# Patient Record
Sex: FEMALE | Race: WHITE | NOT HISPANIC OR LATINO | Employment: OTHER | URBAN - METROPOLITAN AREA
[De-identification: names, ages, dates, MRNs, and addresses within clinical notes are randomized per-mention and may not be internally consistent; named-entity substitution may affect disease eponyms.]

---

## 2018-11-27 ENCOUNTER — HOSPITAL ENCOUNTER (EMERGENCY)
Facility: HOSPITAL | Age: 48
Discharge: HOME | End: 2018-11-28
Attending: EMERGENCY MEDICINE
Payer: MEDICARE

## 2018-11-27 DIAGNOSIS — R42 DIZZINESS: ICD-10-CM

## 2018-11-27 DIAGNOSIS — H81.399 PERIPHERAL VERTIGO, UNSPECIFIED LATERALITY: Primary | ICD-10-CM

## 2018-11-27 LAB
ALBUMIN SERPL BCP-MCNC: 3.8 G/DL
ALP SERPL-CCNC: 96 U/L
ALT SERPL W/O P-5'-P-CCNC: 74 U/L
ANION GAP SERPL CALC-SCNC: 8 MMOL/L
AST SERPL-CCNC: 50 U/L
B-HCG UR QL: NEGATIVE
BACTERIA #/AREA URNS HPF: ABNORMAL /HPF
BASOPHILS # BLD AUTO: 0.05 K/UL
BASOPHILS NFR BLD: 0.6 %
BILIRUB SERPL-MCNC: 0.3 MG/DL
BILIRUB UR QL STRIP: NEGATIVE
BUN SERPL-MCNC: 18 MG/DL
CALCIUM SERPL-MCNC: 9.4 MG/DL
CHLORIDE SERPL-SCNC: 105 MMOL/L
CLARITY UR: CLEAR
CO2 SERPL-SCNC: 24 MMOL/L
COLOR UR: YELLOW
CREAT SERPL-MCNC: 0.8 MG/DL
CTP QC/QA: YES
DIFFERENTIAL METHOD: ABNORMAL
EOSINOPHIL # BLD AUTO: 0.3 K/UL
EOSINOPHIL NFR BLD: 4 %
ERYTHROCYTE [DISTWIDTH] IN BLOOD BY AUTOMATED COUNT: 15.5 %
EST. GFR  (AFRICAN AMERICAN): >60 ML/MIN/1.73 M^2
EST. GFR  (NON AFRICAN AMERICAN): >60 ML/MIN/1.73 M^2
GLUCOSE SERPL-MCNC: 100 MG/DL (ref 70–110)
GLUCOSE SERPL-MCNC: 110 MG/DL
GLUCOSE UR QL STRIP: NEGATIVE
HCT VFR BLD AUTO: 40 %
HGB BLD-MCNC: 12.7 G/DL
HGB UR QL STRIP: ABNORMAL
KETONES UR QL STRIP: NEGATIVE
LEUKOCYTE ESTERASE UR QL STRIP: ABNORMAL
LIPASE SERPL-CCNC: 37 U/L
LYMPHOCYTES # BLD AUTO: 2.5 K/UL
LYMPHOCYTES NFR BLD: 32.3 %
MCH RBC QN AUTO: 27.2 PG
MCHC RBC AUTO-ENTMCNC: 31.8 G/DL
MCV RBC AUTO: 86 FL
MICROSCOPIC COMMENT: ABNORMAL
MONOCYTES # BLD AUTO: 0.6 K/UL
MONOCYTES NFR BLD: 7.4 %
NEUTROPHILS # BLD AUTO: 4.4 K/UL
NEUTROPHILS NFR BLD: 55.7 %
NITRITE UR QL STRIP: NEGATIVE
PH UR STRIP: 5 [PH] (ref 5–8)
PLATELET # BLD AUTO: 247 K/UL
PMV BLD AUTO: 12.6 FL
POCT GLUCOSE: 100 MG/DL (ref 70–110)
POTASSIUM SERPL-SCNC: 4.3 MMOL/L
PROT SERPL-MCNC: 8.2 G/DL
PROT UR QL STRIP: NEGATIVE
RBC # BLD AUTO: 4.67 M/UL
RBC #/AREA URNS HPF: 1 /HPF (ref 0–4)
SODIUM SERPL-SCNC: 137 MMOL/L
SP GR UR STRIP: 1.01 (ref 1–1.03)
SQUAMOUS #/AREA URNS HPF: 15 /HPF
TRICHOMONAS UR QL MICRO: ABNORMAL
TROPONIN I SERPL DL<=0.01 NG/ML-MCNC: <0.006 NG/ML
URN SPEC COLLECT METH UR: ABNORMAL
UROBILINOGEN UR STRIP-ACNC: ABNORMAL EU/DL
WBC # BLD AUTO: 7.81 K/UL
WBC #/AREA URNS HPF: 8 /HPF (ref 0–5)

## 2018-11-27 PROCEDURE — 36000 PLACE NEEDLE IN VEIN: CPT

## 2018-11-27 PROCEDURE — 82962 GLUCOSE BLOOD TEST: CPT | Mod: 91

## 2018-11-27 PROCEDURE — 83690 ASSAY OF LIPASE: CPT

## 2018-11-27 PROCEDURE — 93010 ELECTROCARDIOGRAM REPORT: CPT | Mod: ,,, | Performed by: INTERNAL MEDICINE

## 2018-11-27 PROCEDURE — 93005 ELECTROCARDIOGRAM TRACING: CPT

## 2018-11-27 PROCEDURE — 81025 URINE PREGNANCY TEST: CPT | Performed by: PHYSICIAN ASSISTANT

## 2018-11-27 PROCEDURE — 84484 ASSAY OF TROPONIN QUANT: CPT

## 2018-11-27 PROCEDURE — 81000 URINALYSIS NONAUTO W/SCOPE: CPT

## 2018-11-27 PROCEDURE — 99285 EMERGENCY DEPT VISIT HI MDM: CPT | Mod: 25

## 2018-11-27 PROCEDURE — 80053 COMPREHEN METABOLIC PANEL: CPT

## 2018-11-27 PROCEDURE — 85025 COMPLETE CBC W/AUTO DIFF WBC: CPT

## 2018-11-27 RX ORDER — FERROUS SULFATE 325(65) MG
325 TABLET, DELAYED RELEASE (ENTERIC COATED) ORAL
COMMUNITY
End: 2019-11-25

## 2018-11-27 RX ORDER — LEVOTHYROXINE SODIUM 125 UG/1
125 TABLET ORAL DAILY
COMMUNITY
End: 2019-11-25

## 2018-11-27 RX ORDER — LISINOPRIL 20 MG/1
20 TABLET ORAL DAILY
Status: ON HOLD | COMMUNITY
End: 2018-12-27

## 2018-11-28 VITALS
OXYGEN SATURATION: 97 % | BODY MASS INDEX: 47.09 KG/M2 | HEART RATE: 58 BPM | RESPIRATION RATE: 18 BRPM | TEMPERATURE: 98 F | HEIGHT: 66 IN | WEIGHT: 293 LBS | DIASTOLIC BLOOD PRESSURE: 71 MMHG | SYSTOLIC BLOOD PRESSURE: 101 MMHG

## 2018-11-28 RX ORDER — MECLIZINE HCL 12.5 MG 12.5 MG/1
12.5 TABLET ORAL 3 TIMES DAILY PRN
Qty: 20 TABLET | Refills: 0 | Status: SHIPPED | OUTPATIENT
Start: 2018-11-28

## 2018-11-28 NOTE — ED TRIAGE NOTES
Patient arrived to Ed with c/o of dizziness, nausea ,double vision  And unsteady gait x3 day. Denies any fever, chest pain, and change of LOC.

## 2018-11-28 NOTE — ED PROVIDER NOTES
"Encounter Date: 11/27/2018  48 y.o. female complains dizziness, headache, nausea x3 days.  Patient will be seen by another provider for further evaluation when an exam room is available. Mir BARAKAT, 8:38 PM    SCRIBE #1 NOTE: I, Carrillo Reynoso, am scribing for, and in the presence of,  Hipolito Love MD. I have scribed the following portions of the note - Other sections scribed: HPI, ROS, PE.       History     Chief Complaint   Patient presents with    Dizziness     "I think it's my blood pressure." Pt reports dizziness x 3 days. Pt reports feeling "drunk for 3 days, trying to fall over but I had to catch myself. And my eyes feel like they go cross." Denies ETOH.     CC: Dizziness    HPI: This 48 y.o. Female with depression and HTN presents to the ED for an emergent evaluation of acute onset mild dizziness and light-headedness for the past x3 days. Pt reports her symptoms began in the morning and on the first day she had nausea and vomiting which was relieved that day. "I feel drunk but did not drink alcohol." Her dizziness feels like a "room spinning" sensation which has been occurring all day today. Pt admits last time she felt like this was when she was dx with HTN. She got back on her meds 6/2018 and takes them daily. Pt denies fever, abdominal pain, dysuria, numbness or back pain.    PSHx: C section, upper/lower GI colonoscopy 11/16/18, polyp biopsy      The history is provided by the patient. No  was used.     Review of patient's allergies indicates:  Allergies not on file  Past Medical History:   Diagnosis Date    Depression     Hypertension      History reviewed. No pertinent surgical history.  History reviewed. No pertinent family history.  Social History     Tobacco Use    Smoking status: Never Smoker    Smokeless tobacco: Never Used   Substance Use Topics    Alcohol use: Yes     Comment: socially    Drug use: No     Review of Systems   Constitutional: Negative for chills " and fever.   HENT: Negative for ear pain, rhinorrhea and sore throat.    Eyes: Negative for redness.   Respiratory: Negative for shortness of breath.    Cardiovascular: Negative for chest pain.   Gastrointestinal: Negative for abdominal pain, diarrhea, nausea and vomiting.   Genitourinary: Negative for dysuria and hematuria.   Musculoskeletal: Negative for back pain and neck pain.   Skin: Negative for rash.   Neurological: Positive for dizziness and light-headedness. Negative for weakness, numbness and headaches.   Hematological: Does not bruise/bleed easily.   Psychiatric/Behavioral: The patient is not nervous/anxious.        Physical Exam     Initial Vitals [11/27/18 2036]   BP Pulse Resp Temp SpO2   (!) 143/70 60 18 98.5 °F (36.9 °C) 97 %      MAP       --         Physical Exam    Nursing note and vitals reviewed.  Constitutional: She appears well-developed and well-nourished. She is not diaphoretic. She is Obese . No distress.   Pt is morbidly obese.   HENT:   Head: Normocephalic and atraumatic.   Nose: Nose normal.   Eyes: EOM are normal. Pupils are equal, round, and reactive to light. No scleral icterus.   Neck: Normal range of motion. Neck supple.   Cardiovascular: Normal rate, regular rhythm, normal heart sounds and intact distal pulses. Exam reveals no gallop and no friction rub.    No murmur heard.  Pulmonary/Chest: Breath sounds normal. No stridor. No respiratory distress. She has no wheezes. She has no rhonchi. She has no rales.   Abdominal: Soft. Normal appearance and bowel sounds are normal. She exhibits no distension. There is no tenderness. There is no rebound and no guarding.   Musculoskeletal: Normal range of motion. She exhibits no edema or tenderness.   Neurological: She is oriented to person, place, and time. No cranial nerve deficit.   Skin: Skin is warm and dry. No rash noted.   Psychiatric: She has a normal mood and affect. Her behavior is normal.         ED Course   Procedures  Labs Reviewed    URINALYSIS, REFLEX TO URINE CULTURE - Abnormal; Notable for the following components:       Result Value    Occult Blood UA 1+ (*)     Urobilinogen, UA 2.0-3.0 (*)     Leukocytes, UA 2+ (*)     All other components within normal limits    Narrative:     Preferred Collection Type->Urine, Clean Catch   CBC W/ AUTO DIFFERENTIAL - Abnormal; Notable for the following components:    MCHC 31.8 (*)     RDW 15.5 (*)     All other components within normal limits   COMPREHENSIVE METABOLIC PANEL - Abnormal; Notable for the following components:    AST 50 (*)     ALT 74 (*)     All other components within normal limits   URINALYSIS MICROSCOPIC - Abnormal; Notable for the following components:    WBC, UA 8 (*)     Bacteria, UA Few (*)     Trichomonas, UA Occasional (*)     All other components within normal limits    Narrative:     Preferred Collection Type->Urine, Clean Catch   LIPASE   TROPONIN I   POCT URINE PREGNANCY   POCT GLUCOSE   POCT GLUCOSE MONITORING CONTINUOUS          Imaging Results          CT Head Without Contrast (Final result)  Result time 11/27/18 23:32:50    Final result by Noah Foley MD (11/27/18 23:32:50)                 Impression:      No CT evidence of acute intracranial abnormality. Clinical correlation and further evaluation as warranted.    Mild paranasal sinus disease as above.      Electronically signed by: Noah Foley MD  Date:    11/27/2018  Time:    23:32             Narrative:    EXAMINATION:  CT HEAD WITHOUT CONTRAST    CLINICAL HISTORY:  persistent dizziness;    TECHNIQUE:  Low dose axial images were obtained through the head.  Coronal and sagittal reformations were also performed. Contrast was not administered.    COMPARISON:  None.    FINDINGS:  There is no acute intracranial hemorrhage, hydrocephalus, midline shift or mass effect. Gray-white matter differentiation appears maintained. No extra-axial fluid collections identified.  The basal cisterns are patent. There is mild  mucosal thickening of the left sphenoid sinus.  There is minimal opacification of the right mastoid air cells.  The visualized bones of the calvarium demonstrate no acute osseous abnormality.                                 Medical Decision Making:   Initial Assessment:   48-year-old female history of morbid obesity, hypertension, hyperlipidemia presenting with persistent dizziness intermittent for 3 days.  Patient states they dizziness was worse, lasting 10 min at a time without relief with holding still.  Patient states could be brought on by walking.  Notes it is usually worse 1st thing in the morning.  Denies any exacerbating or relieving factors.  Reports that mostly feels like a vertiginous feeling.  Denies chest pain, nausea, vomiting, shortness of breath, diaphoresis.  On exam, patient well-appearing in no acute distress.  Neuro exam normal.  No focal neuro deficits.  Equal bilateral  strength, no pronator drift, no ataxia with finger-to-nose or heel-to-shin, no nystagmus.  Cranial nerves 2-12 intact. Dizziness was unable to be provoked with Dallas-Hallpike test.  The patient denies any minimal ongoing dizziness at the time of exam.  We will get a head CT, labs, EKG, and reassess.  EKG shows sinus bradycardia, rate of 57, no significant ST segment elevation or depressions concerning for acute ischemia.  ED Management:  CT scan and labs unremarkable. Symptoms have been ongoing for greater than 3 days.  They are intermittent and associated with position change.  They are not associated with any other neurologic deficits.  She denies changes in vision.  Denies chest pain, shortness of breath, nausea, vomiting. Neuro exam is completely normal. No nystagmus or ataxia.  I significantly doubt central cause.  Test of skew negative and head impulse not consistent with a central cause.  Vertigo is not persistent.  I note some effusion behind her ear.  It does not look significantly infected.  I discussed the need to  follow up with her primary care provider as well as her neurologist.  She is mildly orthostatic positive. Given this, I believe she is safe for discharge home.  I have discussed strict return precautions.  She is amenable to being discharged.  I encourage plenty of p.o. fluid intake as well as have prescribed her a prescription for meclizine.            Scribe Attestation:   Scribe #1: I performed the above scribed service and the documentation accurately describes the services I performed. I attest to the accuracy of the note.    Attending Attestation:           Physician Attestation for Scribe:  Physician Attestation Statement for Scribe #1: I, Hipolito Love MD, reviewed documentation, as scribed by Carrillo Reynoso in my presence, and it is both accurate and complete.                    Clinical Impression:   The primary encounter diagnosis was Peripheral vertigo, unspecified laterality. A diagnosis of Dizziness was also pertinent to this visit.      Disposition:   Disposition: Discharged  Condition: Stable                        Hipolito Love MD  11/28/18 0056

## 2018-11-28 NOTE — DISCHARGE INSTRUCTIONS
You were seen in the emergency department for dizziness.  Your labs, exam, and CT scan are all reassuring.  We are not sure what the cause of your dizziness is but we do not think it is anything dangerous at this time.  Please follow-up with your primary care provider this week.  Please return for any new or worsening episodes, nausea, vomiting, difficulty breathing, numbness, weakness, changes in vision, you become concerned in any other way.

## 2018-12-24 ENCOUNTER — HOSPITAL ENCOUNTER (INPATIENT)
Facility: HOSPITAL | Age: 48
LOS: 3 days | Discharge: HOME | DRG: 872 | End: 2018-12-27
Attending: EMERGENCY MEDICINE | Admitting: INTERNAL MEDICINE
Payer: MEDICARE

## 2018-12-24 DIAGNOSIS — L03.90 CELLULITIS: ICD-10-CM

## 2018-12-24 DIAGNOSIS — L03.90 SEPSIS DUE TO CELLULITIS: ICD-10-CM

## 2018-12-24 DIAGNOSIS — R10.11 RUQ PAIN: ICD-10-CM

## 2018-12-24 DIAGNOSIS — R00.0 TACHYCARDIA: ICD-10-CM

## 2018-12-24 DIAGNOSIS — A41.9 SEPSIS DUE TO CELLULITIS: ICD-10-CM

## 2018-12-24 DIAGNOSIS — A41.9 SEPSIS, DUE TO UNSPECIFIED ORGANISM: Primary | ICD-10-CM

## 2018-12-24 DIAGNOSIS — A59.9 TRICHOMONAS INFECTION: ICD-10-CM

## 2018-12-24 LAB
ALBUMIN SERPL BCP-MCNC: 3.8 G/DL
ALP SERPL-CCNC: 91 U/L
ALT SERPL W/O P-5'-P-CCNC: 85 U/L
ANION GAP SERPL CALC-SCNC: 10 MMOL/L
AST SERPL-CCNC: 62 U/L
B-HCG UR QL: NEGATIVE
BACTERIA #/AREA URNS HPF: ABNORMAL /HPF
BASOPHILS # BLD AUTO: 0.01 K/UL
BASOPHILS NFR BLD: 0.1 %
BILIRUB SERPL-MCNC: 0.9 MG/DL
BILIRUB UR QL STRIP: NEGATIVE
BUN SERPL-MCNC: 25 MG/DL
CALCIUM SERPL-MCNC: 9.2 MG/DL
CHLORIDE SERPL-SCNC: 105 MMOL/L
CLARITY UR: ABNORMAL
CO2 SERPL-SCNC: 20 MMOL/L
COLOR UR: ABNORMAL
CREAT SERPL-MCNC: 1.2 MG/DL
CTP QC/QA: YES
DIFFERENTIAL METHOD: ABNORMAL
EOSINOPHIL # BLD AUTO: 0 K/UL
EOSINOPHIL NFR BLD: 0.1 %
ERYTHROCYTE [DISTWIDTH] IN BLOOD BY AUTOMATED COUNT: 15.6 %
EST. GFR  (AFRICAN AMERICAN): >60 ML/MIN/1.73 M^2
EST. GFR  (NON AFRICAN AMERICAN): 54 ML/MIN/1.73 M^2
GLUCOSE SERPL-MCNC: 94 MG/DL
GLUCOSE UR QL STRIP: NEGATIVE
HCT VFR BLD AUTO: 41.5 %
HGB BLD-MCNC: 13.5 G/DL
HGB UR QL STRIP: NEGATIVE
HYALINE CASTS #/AREA URNS LPF: 1 /LPF
KETONES UR QL STRIP: NEGATIVE
LACTATE SERPL-SCNC: 1.7 MMOL/L
LEUKOCYTE ESTERASE UR QL STRIP: ABNORMAL
LYMPHOCYTES # BLD AUTO: 0.3 K/UL
LYMPHOCYTES NFR BLD: 3.3 %
MCH RBC QN AUTO: 27.8 PG
MCHC RBC AUTO-ENTMCNC: 32.5 G/DL
MCV RBC AUTO: 86 FL
MICROSCOPIC COMMENT: ABNORMAL
MONOCYTES # BLD AUTO: 0.8 K/UL
MONOCYTES NFR BLD: 7.5 %
NEUTROPHILS # BLD AUTO: 9.2 K/UL
NEUTROPHILS NFR BLD: 89 %
NITRITE UR QL STRIP: NEGATIVE
PH UR STRIP: 5 [PH] (ref 5–8)
PLATELET # BLD AUTO: 175 K/UL
PMV BLD AUTO: 12.4 FL
POTASSIUM SERPL-SCNC: 3.3 MMOL/L
PROT SERPL-MCNC: 7.8 G/DL
PROT UR QL STRIP: ABNORMAL
RBC # BLD AUTO: 4.85 M/UL
RBC #/AREA URNS HPF: 3 /HPF (ref 0–4)
SODIUM SERPL-SCNC: 135 MMOL/L
SP GR UR STRIP: 1.02 (ref 1–1.03)
SQUAMOUS #/AREA URNS HPF: 20 /HPF
TRICHOMONAS UR QL MICRO: ABNORMAL
TSH SERPL DL<=0.005 MIU/L-ACNC: 2.62 UIU/ML
URN SPEC COLLECT METH UR: ABNORMAL
UROBILINOGEN UR STRIP-ACNC: ABNORMAL EU/DL
WBC # BLD AUTO: 10.32 K/UL
WBC #/AREA URNS HPF: 20 /HPF (ref 0–5)

## 2018-12-24 PROCEDURE — 96374 THER/PROPH/DIAG INJ IV PUSH: CPT | Mod: 59

## 2018-12-24 PROCEDURE — 93005 ELECTROCARDIOGRAM TRACING: CPT

## 2018-12-24 PROCEDURE — 87040 BLOOD CULTURE FOR BACTERIA: CPT

## 2018-12-24 PROCEDURE — 87491 CHLMYD TRACH DNA AMP PROBE: CPT

## 2018-12-24 PROCEDURE — 81025 URINE PREGNANCY TEST: CPT | Performed by: EMERGENCY MEDICINE

## 2018-12-24 PROCEDURE — 12000002 HC ACUTE/MED SURGE SEMI-PRIVATE ROOM

## 2018-12-24 PROCEDURE — 25000003 PHARM REV CODE 250: Performed by: EMERGENCY MEDICINE

## 2018-12-24 PROCEDURE — 80053 COMPREHEN METABOLIC PANEL: CPT

## 2018-12-24 PROCEDURE — 81000 URINALYSIS NONAUTO W/SCOPE: CPT

## 2018-12-24 PROCEDURE — 87210 SMEAR WET MOUNT SALINE/INK: CPT

## 2018-12-24 PROCEDURE — 96365 THER/PROPH/DIAG IV INF INIT: CPT

## 2018-12-24 PROCEDURE — 87086 URINE CULTURE/COLONY COUNT: CPT

## 2018-12-24 PROCEDURE — 96361 HYDRATE IV INFUSION ADD-ON: CPT | Mod: 59

## 2018-12-24 PROCEDURE — 84443 ASSAY THYROID STIM HORMONE: CPT

## 2018-12-24 PROCEDURE — 83605 ASSAY OF LACTIC ACID: CPT

## 2018-12-24 PROCEDURE — 99285 EMERGENCY DEPT VISIT HI MDM: CPT | Mod: 25

## 2018-12-24 PROCEDURE — 93010 ELECTROCARDIOGRAM REPORT: CPT | Mod: ,,, | Performed by: INTERNAL MEDICINE

## 2018-12-24 PROCEDURE — 85025 COMPLETE CBC W/AUTO DIFF WBC: CPT

## 2018-12-24 PROCEDURE — 87591 N.GONORRHOEAE DNA AMP PROB: CPT

## 2018-12-24 PROCEDURE — 63600175 PHARM REV CODE 636 W HCPCS: Performed by: EMERGENCY MEDICINE

## 2018-12-24 PROCEDURE — 96375 TX/PRO/DX INJ NEW DRUG ADDON: CPT

## 2018-12-24 RX ORDER — METRONIDAZOLE 500 MG/1
2000 TABLET ORAL
Status: COMPLETED | OUTPATIENT
Start: 2018-12-24 | End: 2018-12-24

## 2018-12-24 RX ORDER — ONDANSETRON 2 MG/ML
4 INJECTION INTRAMUSCULAR; INTRAVENOUS
Status: COMPLETED | OUTPATIENT
Start: 2018-12-24 | End: 2018-12-24

## 2018-12-24 RX ADMIN — SODIUM CHLORIDE 2000 ML: 0.9 INJECTION, SOLUTION INTRAVENOUS at 09:12

## 2018-12-24 RX ADMIN — METRONIDAZOLE 2000 MG: 500 TABLET ORAL at 11:12

## 2018-12-24 RX ADMIN — VANCOMYCIN HYDROCHLORIDE 2000 MG: 1 INJECTION, POWDER, LYOPHILIZED, FOR SOLUTION INTRAVENOUS at 10:12

## 2018-12-24 RX ADMIN — ONDANSETRON 4 MG: 2 INJECTION INTRAMUSCULAR; INTRAVENOUS at 10:12

## 2018-12-24 RX ADMIN — PIPERACILLIN AND TAZOBACTAM 4.5 G: 4; .5 INJECTION, POWDER, LYOPHILIZED, FOR SOLUTION INTRAVENOUS; PARENTERAL at 09:12

## 2018-12-25 PROBLEM — I10 ESSENTIAL HYPERTENSION: Status: ACTIVE | Noted: 2018-12-25

## 2018-12-25 PROBLEM — E66.01 MORBID OBESITY: Status: ACTIVE | Noted: 2018-12-25

## 2018-12-25 PROBLEM — L03.90 SEPSIS DUE TO CELLULITIS: Status: ACTIVE | Noted: 2018-12-24

## 2018-12-25 LAB
ANION GAP SERPL CALC-SCNC: 9 MMOL/L
BACTERIA GENITAL QL WET PREP: ABNORMAL
BASOPHILS # BLD AUTO: 0.02 K/UL
BASOPHILS NFR BLD: 0.1 %
BUN SERPL-MCNC: 30 MG/DL
C TRACH DNA SPEC QL NAA+PROBE: NOT DETECTED
CALCIUM SERPL-MCNC: 8.2 MG/DL
CHLORIDE SERPL-SCNC: 106 MMOL/L
CLUE CELLS VAG QL WET PREP: ABNORMAL
CO2 SERPL-SCNC: 20 MMOL/L
CREAT SERPL-MCNC: 1.1 MG/DL
DIFFERENTIAL METHOD: ABNORMAL
EOSINOPHIL # BLD AUTO: 0 K/UL
EOSINOPHIL NFR BLD: 0 %
ERYTHROCYTE [DISTWIDTH] IN BLOOD BY AUTOMATED COUNT: 16.1 %
EST. GFR  (AFRICAN AMERICAN): >60 ML/MIN/1.73 M^2
EST. GFR  (NON AFRICAN AMERICAN): >60 ML/MIN/1.73 M^2
FILAMENT FUNGI VAG WET PREP-#/AREA: ABNORMAL
GLUCOSE SERPL-MCNC: 87 MG/DL
HCT VFR BLD AUTO: 38.5 %
HGB BLD-MCNC: 12 G/DL
LYMPHOCYTES # BLD AUTO: 0.4 K/UL
LYMPHOCYTES NFR BLD: 2.7 %
MCH RBC QN AUTO: 27.1 PG
MCHC RBC AUTO-ENTMCNC: 31.2 G/DL
MCV RBC AUTO: 87 FL
MONOCYTES # BLD AUTO: 0.6 K/UL
MONOCYTES NFR BLD: 4 %
N GONORRHOEA DNA SPEC QL NAA+PROBE: NOT DETECTED
NEUTROPHILS # BLD AUTO: 13.8 K/UL
NEUTROPHILS NFR BLD: 92.9 %
PLATELET # BLD AUTO: 165 K/UL
PMV BLD AUTO: 12.4 FL
POTASSIUM SERPL-SCNC: 3.8 MMOL/L
RBC # BLD AUTO: 4.43 M/UL
SODIUM SERPL-SCNC: 135 MMOL/L
SPECIMEN SOURCE: ABNORMAL
T VAGINALIS GENITAL QL WET PREP: ABNORMAL
WBC # BLD AUTO: 14.86 K/UL
WBC #/AREA VAG WET PREP: ABNORMAL
YEAST GENITAL QL WET PREP: ABNORMAL

## 2018-12-25 PROCEDURE — 25000003 PHARM REV CODE 250: Performed by: EMERGENCY MEDICINE

## 2018-12-25 PROCEDURE — 63600175 PHARM REV CODE 636 W HCPCS: Performed by: EMERGENCY MEDICINE

## 2018-12-25 PROCEDURE — 36415 COLL VENOUS BLD VENIPUNCTURE: CPT

## 2018-12-25 PROCEDURE — 80048 BASIC METABOLIC PNL TOTAL CA: CPT

## 2018-12-25 PROCEDURE — 25000003 PHARM REV CODE 250: Performed by: INTERNAL MEDICINE

## 2018-12-25 PROCEDURE — 94761 N-INVAS EAR/PLS OXIMETRY MLT: CPT

## 2018-12-25 PROCEDURE — 85025 COMPLETE CBC W/AUTO DIFF WBC: CPT

## 2018-12-25 PROCEDURE — 63600175 PHARM REV CODE 636 W HCPCS: Performed by: INTERNAL MEDICINE

## 2018-12-25 PROCEDURE — 11000001 HC ACUTE MED/SURG PRIVATE ROOM

## 2018-12-25 RX ORDER — SODIUM CHLORIDE 9 MG/ML
INJECTION, SOLUTION INTRAVENOUS CONTINUOUS
Status: DISCONTINUED | OUTPATIENT
Start: 2018-12-25 | End: 2018-12-27 | Stop reason: HOSPADM

## 2018-12-25 RX ORDER — LEVOTHYROXINE SODIUM 125 UG/1
125 TABLET ORAL DAILY
Status: DISCONTINUED | OUTPATIENT
Start: 2018-12-25 | End: 2018-12-27 | Stop reason: HOSPADM

## 2018-12-25 RX ORDER — ONDANSETRON 8 MG/1
8 TABLET, ORALLY DISINTEGRATING ORAL EVERY 8 HOURS PRN
Status: DISCONTINUED | OUTPATIENT
Start: 2018-12-25 | End: 2018-12-27 | Stop reason: HOSPADM

## 2018-12-25 RX ORDER — ENOXAPARIN SODIUM 100 MG/ML
40 INJECTION SUBCUTANEOUS EVERY 12 HOURS
Status: DISCONTINUED | OUTPATIENT
Start: 2018-12-25 | End: 2018-12-27 | Stop reason: HOSPADM

## 2018-12-25 RX ORDER — ACETAMINOPHEN 325 MG/1
650 TABLET ORAL EVERY 8 HOURS PRN
Status: DISCONTINUED | OUTPATIENT
Start: 2018-12-25 | End: 2018-12-27 | Stop reason: HOSPADM

## 2018-12-25 RX ADMIN — LEVOTHYROXINE SODIUM 125 MCG: 125 TABLET ORAL at 05:12

## 2018-12-25 RX ADMIN — PIPERACILLIN AND TAZOBACTAM 4.5 G: 4; .5 INJECTION, POWDER, LYOPHILIZED, FOR SOLUTION INTRAVENOUS; PARENTERAL at 09:12

## 2018-12-25 RX ADMIN — VANCOMYCIN HYDROCHLORIDE 1750 MG: 1 INJECTION, POWDER, LYOPHILIZED, FOR SOLUTION INTRAVENOUS at 10:12

## 2018-12-25 RX ADMIN — PIPERACILLIN AND TAZOBACTAM 4.5 G: 4; .5 INJECTION, POWDER, LYOPHILIZED, FOR SOLUTION INTRAVENOUS; PARENTERAL at 05:12

## 2018-12-25 RX ADMIN — SODIUM CHLORIDE: 0.9 INJECTION, SOLUTION INTRAVENOUS at 08:12

## 2018-12-25 RX ADMIN — ENOXAPARIN SODIUM 40 MG: 100 INJECTION SUBCUTANEOUS at 08:12

## 2018-12-25 RX ADMIN — ACETAMINOPHEN 650 MG: 325 TABLET ORAL at 08:12

## 2018-12-25 RX ADMIN — PIPERACILLIN AND TAZOBACTAM 4.5 G: 4; .5 INJECTION, POWDER, LYOPHILIZED, FOR SOLUTION INTRAVENOUS; PARENTERAL at 01:12

## 2018-12-25 RX ADMIN — VANCOMYCIN HYDROCHLORIDE 1750 MG: 1 INJECTION, POWDER, LYOPHILIZED, FOR SOLUTION INTRAVENOUS at 11:12

## 2018-12-25 RX ADMIN — ENOXAPARIN SODIUM 40 MG: 100 INJECTION SUBCUTANEOUS at 09:12

## 2018-12-25 RX ADMIN — SODIUM CHLORIDE 500 ML: 0.9 INJECTION, SOLUTION INTRAVENOUS at 08:12

## 2018-12-25 RX ADMIN — ACETAMINOPHEN 650 MG: 325 TABLET ORAL at 07:12

## 2018-12-25 NOTE — ED PROVIDER NOTES
"Encounter Date: 12/24/2018    This is a SORT/MSE of a 48 y.o. female presenting to the ED with c/o redness to the right lower leg. Hx of cellulitis. Reported fever at home. Patient diaphoretic in triage. . Care will be transferred to an alternate provider when patient is roomed for a full evaluation and final disposition. CHITRA Araujo, RUYP-C 12/24/2018 8:48 PM    SCRIBE #1 NOTE: I, Emily Yee, am scribing for, and in the presence of,  Sandy Oconnor MD. I have scribed the following portions of the note - Other sections scribed: HPI and ROS.       History     Chief Complaint   Patient presents with    Fever     reports she's had cellulitis of right leg x7 years and reports redness to area; reports taking naproxen at 1630; pt is diaphoretic at triage; denies trauma to area     CC: Leg Pain    HPI: This 48 y.o. Female, with a medical history of depression and hypertension, presents to the ED c/o acute, constant, moderate (5/10) pain and redness to the right upper leg that began today. Pt reports experiencing a "stretching" sensation to the area when moving as she adds that it also feels as though someone is "scratching" the leg. She reports experiencing a fever (presently resolved), chills, shakes but she notes that the symptoms have since improved. Pt states that she also experienced 1x episode of emesis about 1x hour ago, noting that she continues to feel nauseated and has a decreased appetite. She adds that she has been experiencing urinary frequency as well as lower abdominal pain and abdominal bloating (when bending down to urinate). She notes taking Naproxen for treatment at 4:30 pm today. Pt reports that she was recently diagnosed with an enlarged liver, enlarged spleen and gallstones ("It's covering 50% of my gallbladder"). She adds that she has also been experiencing acid reflux issues lately, noting that she has been taking medication prescribed by her gastroenterologist with some improvement " (notes she is continuing to burp). Pt denies dysuria, cough and sneezing. No other associated symptoms. No alleviating factors.       The history is provided by the patient. No  was used.     Review of patient's allergies indicates:  No Known Allergies  Past Medical History:   Diagnosis Date    Anemia     Bronchitis     Depression     GERD (gastroesophageal reflux disease)     Hypertension      Past Surgical History:   Procedure Laterality Date     SECTION      COLONOSCOPY      UPPER GASTROINTESTINAL ENDOSCOPY       History reviewed. No pertinent family history.  Social History     Tobacco Use    Smoking status: Never Smoker    Smokeless tobacco: Never Used   Substance Use Topics    Alcohol use: No     Frequency: Never    Drug use: No     Review of Systems   Constitutional: Positive for appetite change (decreased), chills and fever.        (+) shakes   HENT: Negative for congestion, ear pain, rhinorrhea, sneezing and sore throat.    Eyes: Negative for pain and visual disturbance.   Respiratory: Negative for cough and shortness of breath.    Cardiovascular: Negative for chest pain.   Gastrointestinal: Positive for abdominal pain (lower), nausea and vomiting. Negative for diarrhea.        (+) abdominal bloating   Genitourinary: Positive for frequency. Negative for dysuria.   Musculoskeletal: Negative for neck pain.        (+) right upper leg pain   Skin: Positive for color change (redness to the right upper leg). Negative for rash.   Neurological: Negative for headaches.   Psychiatric/Behavioral:        (+) difficulty concentrating       Physical Exam     Initial Vitals [18]   BP Pulse Resp Temp SpO2   119/69 (!) 131 (!) 25 99.6 °F (37.6 °C) 97 %      MAP       --         Physical Exam    Constitutional: She appears well-developed and well-nourished. She is diaphoretic. No distress.   Morbidly obese   HENT:   Mouth/Throat: Oropharynx is clear and moist.   Eyes: Pupils  are equal, round, and reactive to light. No scleral icterus.   Neck: Neck supple.   Cardiovascular: Regular rhythm and intact distal pulses. Tachycardia present.    Pulmonary/Chest: Breath sounds normal. No respiratory distress. She has no wheezes. She has no rhonchi. She has no rales.   Abdominal: Soft. Bowel sounds are normal. There is tenderness (TTP in RUQ, no rigidity or guarding).   Genitourinary:   Genitourinary Comments: Pelvic exam performed with Elena ABBOTT present as chaperone.  I do not appreciate any tenderness or swelling about the groin, labia bilaterally.  I am unable to visualize the patient's cervix on exam, she has vaginal tissue that obstructs the view, I do not appreciate any discharge in the vaginal vault.  She has no cervical motion tenderness on bimanual exam.   Neurological: She is alert and oriented to person, place, and time.   Skin: Skin is warm.   Large area of erythema on right upper middle thigh.  There is no crepitus.  Area is mildly tender to palpation.  I do not appreciate any hemorrhagic bullae.  I do not appreciate any areas of fluctuance, however exam is somewhat limited due to patient's body habitus.   Psychiatric: She has a normal mood and affect.         ED Course   Critical Care  Date/Time: 12/24/2018 11:49 PM  Performed by: Sandy Oconnor MD  Authorized by: Sandy Oconnor MD   Direct patient critical care time: 30 minutes  Ordering / reviewing critical care time: 10 minutes  Documentation critical care time: 10 minutes  Total critical care time (exclusive of procedural time) : 50 minutes  Critical care was necessary to treat or prevent imminent or life-threatening deterioration of the following conditions: circulatory failure and sepsis.  Critical care was time spent personally by me on the following activities: development of treatment plan with patient or surrogate, examination of patient, evaluation of patient's response to treatment, obtaining history from patient  or surrogate, ordering and performing treatments and interventions, ordering and review of radiographic studies, ordering and review of laboratory studies and re-evaluation of patient's condition.        Labs Reviewed   CBC W/ AUTO DIFFERENTIAL - Abnormal; Notable for the following components:       Result Value    RDW 15.6 (*)     Gran # (ANC) 9.2 (*)     Lymph # 0.3 (*)     Gran% 89.0 (*)     Lymph% 3.3 (*)     All other components within normal limits   COMPREHENSIVE METABOLIC PANEL - Abnormal; Notable for the following components:    Sodium 135 (*)     Potassium 3.3 (*)     CO2 20 (*)     BUN, Bld 25 (*)     AST 62 (*)     ALT 85 (*)     eGFR if non  54 (*)     All other components within normal limits   URINALYSIS, REFLEX TO URINE CULTURE - Abnormal; Notable for the following components:    Appearance, UA Cloudy (*)     Protein, UA 1+ (*)     Urobilinogen, UA 2.0-3.0 (*)     Leukocytes, UA 3+ (*)     All other components within normal limits    Narrative:     Preferred Collection Type->Urine, Clean Catch   URINALYSIS MICROSCOPIC - Abnormal; Notable for the following components:    WBC, UA 20 (*)     Bacteria, UA Many (*)     Trichomonas, UA Occasional (*)     All other components within normal limits    Narrative:     Preferred Collection Type->Urine, Clean Catch   CULTURE, BLOOD   CULTURE, BLOOD   CULTURE, URINE   C. TRACHOMATIS/N. GONORRHOEAE BY AMP DNA   LACTIC ACID, PLASMA   TSH   LACTIC ACID, PLASMA   VAGINAL SCREEN   POCT URINE PREGNANCY        ECG Results          EKG 12-lead (Preliminary result)  Result time 12/24/18 21:20:35    ED Interpretation by Sandy Oconnor MD (12/24/18 21:20:35)    Normal sinus rhythm, rate 98 beats per minute, normal IN interval.  There is no ST elevation.  QTC is markedly prolonged at 7:12 a.m. milliseconds.  QRS 94 milliseconds.                            Imaging Results          US Abdomen Limited (Gallbladder) (Final result)  Result time 12/24/18  22:40:10    Final result by Kenny Hanson MD (12/24/18 22:40:10)                 Impression:      1. Cholelithiasis.  No ultrasonographic evidence to suggest acute cholecystitis.  2. Hepatomegaly.      Electronically signed by: Kenny Hanson MD  Date:    12/24/2018  Time:    22:40             Narrative:    EXAMINATION:  US ABDOMEN LIMITED    CLINICAL HISTORY:  Right upper quadrant pain    TECHNIQUE:  Limited ultrasound of the right upper quadrant of the abdomen (including pancreas, liver, gallbladder, common bile duct, and right kidney) was performed.    COMPARISON:  None.    FINDINGS:  The liver is enlarged measuring 21.5cm.  Hepatic parenchyma is homogeneous without evidence for masses.  No intra- or extrahepatic biliary ductal dilatation. The common bile duct measures 0.4 cm.  The gallbladder demonstrates numerous mobile stones.  No evidence of gallbladder wall thickening or pericholecystic fluid.  Sonographic Tello's sign is negative. The visualized portions of the pancreas, IVC, and aorta appear normal. The right kidney measures 9.7 cm. No ascites.                               X-Ray Chest AP Portable (Final result)  Result time 12/24/18 22:50:39    Final result by Timmy Chavez MD (12/24/18 22:50:39)                 Impression:      No acute findings in the chest.      Electronically signed by: Timmy Chavez MD  Date:    12/24/2018  Time:    22:50             Narrative:    EXAMINATION:  XR CHEST AP PORTABLE    CLINICAL HISTORY:  fever, cough;    TECHNIQUE:  Single frontal view of the chest was performed.    COMPARISON:  None    FINDINGS:  No consolidation, pleural effusion or pneumothorax.    Cardiomediastinal silhouette is unremarkable.                               X-Ray Femur Ap/Lat Right (Final result)  Result time 12/24/18 22:51:42    Final result by Timmy Chavez MD (12/24/18 22:51:42)                 Impression:      No acute fracture.  No bone destruction.    DJD.    Soft tissue  swelling.      Electronically signed by: Timmy Chavez MD  Date:    12/24/2018  Time:    22:51             Narrative:    EXAMINATION:  XR FEMUR 2 VIEW RIGHT    CLINICAL HISTORY:  Cellulitis, unspecified    TECHNIQUE:  AP and lateral views of the right femur were performed.    COMPARISON:  None    FINDINGS:  No acute fracture or dislocation.  No bone destruction.  Tricompartment degenerative change at the right knee.  Mild degenerative change at the right hip.  Diffuse soft tissue swelling.                                 Medical Decision Making:   Initial Assessment:   48-year-old female presents with skin redness, right upper quadrant tenderness, nausea and vomiting. Exam concerning for large area of erythema to the right middle thigh.  She also has right upper quadrant tenderness and history of gallstones.  Her vital signs are concerning, she is tachycardic and hypotensive.  Code sepsis initiated.  I will cover patient with vancomycin and Zosyn, this should cover for skin gretchen as well as intra-abdominal infection.  Will give 2 L bolus, treating patient with 30 cc per kg bolus for her ideal body weight.  I have ordered a right upper quadrant ultrasound, x-ray of her thigh to rule out subcutaneous emphysema, chest x-ray, urinalysis, labs including blood cultures and lactic acid.  I suspect this patient is septic.  Her source is likely UTI versus cholecystitis versus cellulitis.  Considered necrotizing fasciitis, however, pain is not out of proportion to exam, I do not appreciate any crepitus or hemorrhagic bullae.  Area will be marked to monitor for any signs of rapid progression.  ED Management:  I discussed this case with Dr. ordaz, will admit for inpatient to continue IV antibiotics.  Patient will be treated for Trichomonas with 1 time dose of p.o. Flagyl.  Will continue treatment with vancomycin and Zosyn.  I reviewed patient's results, care plan going forward.  At this time she denies any further  questions.            Scribe Attestation:   Scribe #1: I performed the above scribed service and the documentation accurately describes the services I performed. I attest to the accuracy of the note.    Attending Attestation:           Physician Attestation for Scribe:  Physician Attestation Statement for Scribe #1: I, Sandy Oconnor MD, reviewed documentation, as scribed by Emily Yee in my presence, and it is both accurate and complete.                 ED Course as of Dec 24 2325   Mon Dec 24, 2018   2308 Labs and imaging reviewed, white count is normal, lactic acid is normal. UPT is negative.  Patient's urinalysis shows leukocytes with Trichomonas.  On chart review, patient's urinalysis from previous visit in November was also positive for occasional Trichomonas.  She is sexually active with her , she reports prior to marrying her  she has not been sexually active for the last 2 years.  I advised her would not be able to tell her who she contracted this from or when she contracted it, but I did advise her we will treat her with Flagyl and I advised that her  get tested and treated as well prior to any more sexual contact.  Her ultrasound shows gallstones without evidence of obstruction or infection.  Her LFTs are mildly elevated consistent with previous results.  X-ray was negative for subcutaneous emphysema. Chest x-ray negative for acute finding.  Her vital signs have improved, heart rate is 87, blood pressure 106/54.  I reviewed these findings with patient and advised will admit to the hospital for antibiotics.  The wound on her right leg was checked after was marked, there has been no progression of the erythema.  [LH]      ED Course User Index  [LH] Sandy Oconnor MD     Clinical Impression:   The primary encounter diagnosis was Sepsis, due to unspecified organism. Diagnoses of Tachycardia, RUQ pain, Cellulitis, and Trichomonas infection were also pertinent to this  visit.                             Sandy Oconnor MD  12/24/18 [id number]       Sandy Oconnor MD  12/24/18 1156

## 2018-12-25 NOTE — NURSING
on the unit and was notified of patient's low BP. Patient is completely asymptomatic, smiling, pleasant and eating her breakfast.  ordered a NS bolus (500ml) also maintenance fluids NS @125ml/hr. Orders initiated.

## 2018-12-25 NOTE — ED TRIAGE NOTES
Pt presents to ED with c/o cellulitis in right leg that started today. Pt with history of cellulitis, last occurring September of last year. Pt c/o chills, states she had a fever that broke. Pt also with one episode of reported emesis. Pt with headache but states that has improved with naproxen taken around 4:30.

## 2018-12-25 NOTE — PLAN OF CARE
To patient's room to discuss patient managing her care at home.      TN Role Explained.  Patient identified by using 2 identifiers:  Name and date of birth    Patient stated that her  WILL HELP AT HOME WITH her RECOVERY.      TN name and contact info placed on the communication board    Preferred Pharmacy:[facility]       12/25/18 1229   Discharge Assessment   Assessment Type Discharge Planning Assessment   Confirmed/corrected address and phone number on facesheet? Yes   Assessment information obtained from? Patient   Expected Length of Stay (days) 3   Communicated expected length of stay with patient/caregiver yes   Prior to hospitilization cognitive status: Alert/Oriented   Prior to hospitalization functional status: Independent   Current cognitive status: Alert/Oriented   Current Functional Status: Independent   Lives With spouse   Able to Return to Prior Arrangements yes   Is patient able to care for self after discharge? Yes   Patient's perception of discharge disposition home or selfcare   Readmission Within the Last 30 Days no previous admission in last 30 days   Patient currently being followed by outpatient case management? No   Patient currently receives any other outside agency services? No   Equipment Currently Used at Home cane, straight   Do you have any problems affording any of your prescribed medications? No   Is the patient taking medications as prescribed? yes   Does the patient have transportation home? Yes   Transportation Anticipated public transportation  (uses [employer])   Does the patient receive services at [facility]? No   Discharge Plan A Home with family   Discharge Plan B (tbd)   Patient/Family in Agreement with Plan yes   Does the patient have transportation to healthcare appointments? Yes  (uses [employer])

## 2018-12-25 NOTE — HPI
"48 year old female with morbid obesity, hypertension, cholelithiasis, fatty liver, unstable ambulation and history of lower extremity cellulitis who presented with worsening right thigh pain and redness. Patient stated she's had cellulitis to both of her legs before. Has required hospitalization in the past. Reports associated subjective fever, chills, "shakes", all improved prior to arrival. Also reported nausea and one episode of emesis.     In the ED, patient was afebrile but hypotensive, tachycardic with leukocytosis and erythema or right thigh. Admitted for management of sepsis 2/2 cellulitis.  "

## 2018-12-25 NOTE — SUBJECTIVE & OBJECTIVE
Past Medical History:   Diagnosis Date    Anemia     Bronchitis     Depression     GERD (gastroesophageal reflux disease)     Hypertension        Past Surgical History:   Procedure Laterality Date     SECTION      COLONOSCOPY      UPPER GASTROINTESTINAL ENDOSCOPY         Review of patient's allergies indicates:  No Known Allergies    No current facility-administered medications on file prior to encounter.      Current Outpatient Medications on File Prior to Encounter   Medication Sig    ferrous sulfate 325 (65 FE) MG EC tablet Take 325 mg by mouth 3 (three) times daily with meals.    levothyroxine (SYNTHROID) 125 MCG tablet Take 125 mcg by mouth once daily.    lisinopril (PRINIVIL,ZESTRIL) 20 MG tablet Take 20 mg by mouth once daily.    meclizine (ANTIVERT) 12.5 mg tablet Take 1 tablet (12.5 mg total) by mouth 3 (three) times daily as needed for Dizziness.    MELATONIN ORAL Take by mouth.     Family History     None        Tobacco Use    Smoking status: Never Smoker    Smokeless tobacco: Never Used   Substance and Sexual Activity    Alcohol use: No     Frequency: Never    Drug use: No    Sexual activity: No     Review of Systems   Constitutional: Positive for chills and fever.   Respiratory: Negative.    Cardiovascular: Negative.    Gastrointestinal: Positive for nausea and vomiting.   Endocrine: Negative.    Genitourinary: Negative.    Musculoskeletal: Negative.    Skin: Positive for rash.   Neurological: Negative.    Hematological: Negative.    Psychiatric/Behavioral: Negative.      Objective:     Vital Signs (Most Recent):  Temp: 99.1 °F (37.3 °C) (18 0809)  Pulse: 78 (18 0809)  Resp: 17 (18 0809)  BP: (!) 73/36 (18 0809)  SpO2: 96 % (18 0811) Vital Signs (24h Range):  Temp:  [98.2 °F (36.8 °C)-99.6 °F (37.6 °C)] 99.1 °F (37.3 °C)  Pulse:  [] 78  Resp:  [17-25] 17  SpO2:  [94 %-97 %] 96 %  BP: ()/(36-69) 73/36     Weight: (!) 160.8 kg (354 lb 8  oz)  Body mass index is 57.22 kg/m².    Physical Exam   Constitutional: She is oriented to person, place, and time. She appears well-developed. No distress.   Non toxic appearing  Morbidly obese   HENT:   Head: Normocephalic and atraumatic.   Mouth/Throat: Oropharynx is clear and moist.   Eyes: Conjunctivae and EOM are normal.   Neck: Normal range of motion.   Cardiovascular: Normal rate and regular rhythm.   Pulmonary/Chest: Effort normal and breath sounds normal.   Abdominal: Soft. Bowel sounds are normal.   Musculoskeletal: Normal range of motion.   Erythema right thigh  No fluctuation  Mild edema   Neurological: She is alert and oriented to person, place, and time.   Skin: She is not diaphoretic.   Psychiatric: She has a normal mood and affect. Her behavior is normal. Judgment and thought content normal.   Nursing note and vitals reviewed.        CRANIAL NERVES     CN III, IV, VI   Extraocular motions are normal.        Significant Labs: All pertinent labs within the past 24 hours have been reviewed.    Significant Imaging: I have reviewed all pertinent imaging results/findings within the past 24 hours.  I have reviewed and interpreted all pertinent imaging results/findings within the past 24 hours.

## 2018-12-25 NOTE — H&P
"[facility] Hospital Medicine  History & Physical    Patient Name: Inga Woods  MRN: [id number]  Admission Date: 2018  Attending Physician: Aydee Figueroa MD   Primary Care Provider: Sharmin Yarbrough         Patient information was obtained from patient and ER records.     Subjective:     Principal Problem:Sepsis due to cellulitis    Chief Complaint:   Chief Complaint   Patient presents with    Fever     reports she's had cellulitis of right leg x7 years and reports redness to area; reports taking naproxen at 1630; pt is diaphoretic at triage; denies trauma to area        HPI: 48 year old female with morbid obesity, hypertension, cholelithiasis, fatty liver, unstable ambulation and history of lower extremity cellulitis who presented with worsening right thigh pain and redness. Patient stated she's had cellulitis to both of her legs before. Has required hospitalization in the past. Reports associated subjective fever, chills, "shakes", all improved prior to arrival. Also reported nausea and one episode of emesis.     In the ED, patient was afebrile but hypotensive, tachycardic with leukocytosis and erythema or right thigh. Admitted for management of sepsis 2/2 cellulitis.    Past Medical History:   Diagnosis Date    Anemia     Bronchitis     Depression     GERD (gastroesophageal reflux disease)     Hypertension        Past Surgical History:   Procedure Laterality Date     SECTION      COLONOSCOPY      UPPER GASTROINTESTINAL ENDOSCOPY         Review of patient's allergies indicates:  No Known Allergies    No current facility-administered medications on file prior to encounter.      Current Outpatient Medications on File Prior to Encounter   Medication Sig    ferrous sulfate 325 (65 FE) MG EC tablet Take 325 mg by mouth 3 (three) times daily with meals.    levothyroxine (SYNTHROID) 125 MCG tablet Take 125 mcg by mouth once daily.    lisinopril (PRINIVIL,ZESTRIL) 20 MG " tablet Take 20 mg by mouth once daily.    meclizine (ANTIVERT) 12.5 mg tablet Take 1 tablet (12.5 mg total) by mouth 3 (three) times daily as needed for Dizziness.    MELATONIN ORAL Take by mouth.     Family History     None        Tobacco Use    Smoking status: Never Smoker    Smokeless tobacco: Never Used   Substance and Sexual Activity    Alcohol use: No     Frequency: Never    Drug use: No    Sexual activity: No     Review of Systems   Constitutional: Positive for chills and fever.   Respiratory: Negative.    Cardiovascular: Negative.    Gastrointestinal: Positive for nausea and vomiting.   Endocrine: Negative.    Genitourinary: Negative.    Musculoskeletal: Negative.    Skin: Positive for rash.   Neurological: Negative.    Hematological: Negative.    Psychiatric/Behavioral: Negative.      Objective:     Vital Signs (Most Recent):  Temp: 99.1 °F (37.3 °C) (12/25/18 0809)  Pulse: 78 (12/25/18 0809)  Resp: 17 (12/25/18 0809)  BP: (!) 73/36 (12/25/18 0809)  SpO2: 96 % (12/25/18 0811) Vital Signs (24h Range):  Temp:  [98.2 °F (36.8 °C)-99.6 °F (37.6 °C)] 99.1 °F (37.3 °C)  Pulse:  [] 78  Resp:  [17-25] 17  SpO2:  [94 %-97 %] 96 %  BP: ()/(36-69) 73/36     Weight: (!) 160.8 kg (354 lb 8 oz)  Body mass index is 57.22 kg/m².    Physical Exam   Constitutional: She is oriented to person, place, and time. She appears well-developed. No distress.   Non toxic appearing  Morbidly obese   HENT:   Head: Normocephalic and atraumatic.   Mouth/Throat: Oropharynx is clear and moist.   Eyes: Conjunctivae and EOM are normal.   Neck: Normal range of motion.   Cardiovascular: Normal rate and regular rhythm.   Pulmonary/Chest: Effort normal and breath sounds normal.   Abdominal: Soft. Bowel sounds are normal.   Musculoskeletal: Normal range of motion.   Erythema right thigh  No fluctuation  Mild edema   Neurological: She is alert and oriented to person, place, and time.   Skin: She is not diaphoretic.   Psychiatric:  She has a normal mood and affect. Her behavior is normal. Judgment and thought content normal.   Nursing note and vitals reviewed.        CRANIAL NERVES     CN III, IV, VI   Extraocular motions are normal.        Significant Labs: All pertinent labs within the past 24 hours have been reviewed.    Significant Imaging: I have reviewed all pertinent imaging results/findings within the past 24 hours.  I have reviewed and interpreted all pertinent imaging results/findings within the past 24 hours.    Assessment/Plan:     * Sepsis due to cellulitis    Met criteria for sepsis with tachycardia, leukocytosis and source for infection  With evidence of cellulitis of right thigh  No fluctuation to suspect abscess. Does not appear to involve joint or groin area  Area marked in the ED. Already improving  US to r/o DVT  On vanc and zosyn. BCx pending           Essential hypertension    BP low  Hold BP meds and continue with IVFs for now  Good appetite and PO intake       Morbid obesity    Spent > 5 minutes on weight loss education         VTE Risk Mitigation (From admission, onward)        Ordered     enoxaparin injection 40 mg  Every 12 hours      12/25/18 0032     IP VTE HIGH RISK PATIENT  Once      12/25/18 0032             Aydee Holden MD  Department of Hospital Medicine   [facility]

## 2018-12-26 PROBLEM — E03.9 ACQUIRED HYPOTHYROIDISM: Status: ACTIVE | Noted: 2018-12-26

## 2018-12-26 LAB
ANION GAP SERPL CALC-SCNC: 6 MMOL/L
BACTERIA UR CULT: NORMAL
BASOPHILS # BLD AUTO: 0.01 K/UL
BASOPHILS NFR BLD: 0.1 %
BUN SERPL-MCNC: 31 MG/DL
CALCIUM SERPL-MCNC: 8.4 MG/DL
CHLORIDE SERPL-SCNC: 108 MMOL/L
CO2 SERPL-SCNC: 22 MMOL/L
CREAT SERPL-MCNC: 1 MG/DL
DIFFERENTIAL METHOD: ABNORMAL
EOSINOPHIL # BLD AUTO: 0.1 K/UL
EOSINOPHIL NFR BLD: 0.7 %
ERYTHROCYTE [DISTWIDTH] IN BLOOD BY AUTOMATED COUNT: 16.4 %
EST. GFR  (AFRICAN AMERICAN): >60 ML/MIN/1.73 M^2
EST. GFR  (NON AFRICAN AMERICAN): >60 ML/MIN/1.73 M^2
GLUCOSE SERPL-MCNC: 82 MG/DL
HCT VFR BLD AUTO: 34.5 %
HGB BLD-MCNC: 11.2 G/DL
LYMPHOCYTES # BLD AUTO: 0.6 K/UL
LYMPHOCYTES NFR BLD: 8.5 %
MCH RBC QN AUTO: 27.8 PG
MCHC RBC AUTO-ENTMCNC: 32.5 G/DL
MCV RBC AUTO: 86 FL
MONOCYTES # BLD AUTO: 0.4 K/UL
MONOCYTES NFR BLD: 6.6 %
NEUTROPHILS # BLD AUTO: 5.6 K/UL
NEUTROPHILS NFR BLD: 84.1 %
PLATELET # BLD AUTO: 113 K/UL
PMV BLD AUTO: 11.9 FL
POTASSIUM SERPL-SCNC: 3.6 MMOL/L
RBC # BLD AUTO: 4.03 M/UL
SODIUM SERPL-SCNC: 136 MMOL/L
WBC # BLD AUTO: 6.68 K/UL

## 2018-12-26 PROCEDURE — 25000003 PHARM REV CODE 250: Performed by: EMERGENCY MEDICINE

## 2018-12-26 PROCEDURE — 25000003 PHARM REV CODE 250: Performed by: INTERNAL MEDICINE

## 2018-12-26 PROCEDURE — 36415 COLL VENOUS BLD VENIPUNCTURE: CPT

## 2018-12-26 PROCEDURE — 80048 BASIC METABOLIC PNL TOTAL CA: CPT

## 2018-12-26 PROCEDURE — 11000001 HC ACUTE MED/SURG PRIVATE ROOM

## 2018-12-26 PROCEDURE — 94761 N-INVAS EAR/PLS OXIMETRY MLT: CPT

## 2018-12-26 PROCEDURE — 36569 INSJ PICC 5 YR+ W/O IMAGING: CPT

## 2018-12-26 PROCEDURE — 80202 ASSAY OF VANCOMYCIN: CPT

## 2018-12-26 PROCEDURE — 63600175 PHARM REV CODE 636 W HCPCS: Performed by: INTERNAL MEDICINE

## 2018-12-26 PROCEDURE — 63600175 PHARM REV CODE 636 W HCPCS: Performed by: EMERGENCY MEDICINE

## 2018-12-26 PROCEDURE — 85025 COMPLETE CBC W/AUTO DIFF WBC: CPT

## 2018-12-26 RX ADMIN — ENOXAPARIN SODIUM 40 MG: 100 INJECTION SUBCUTANEOUS at 08:12

## 2018-12-26 RX ADMIN — PIPERACILLIN AND TAZOBACTAM 4.5 G: 4; .5 INJECTION, POWDER, LYOPHILIZED, FOR SOLUTION INTRAVENOUS; PARENTERAL at 05:12

## 2018-12-26 RX ADMIN — LEVOTHYROXINE SODIUM 125 MCG: 125 TABLET ORAL at 05:12

## 2018-12-26 RX ADMIN — ACETAMINOPHEN 650 MG: 325 TABLET ORAL at 04:12

## 2018-12-26 RX ADMIN — VANCOMYCIN HYDROCHLORIDE 1750 MG: 1 INJECTION, POWDER, LYOPHILIZED, FOR SOLUTION INTRAVENOUS at 12:12

## 2018-12-26 NOTE — PLAN OF CARE
Problem: Adult Inpatient Plan of Care  Goal: Plan of Care Review  Outcome: Ongoing (interventions implemented as appropriate)  Pt AAO x 3, ambulates x 1 assist, resp sounds unlabored clear bilateral, heart sounds regular no murmur noted. Afebrile, no s/s of of pain or distress, Midline port infusing Pipercillin , bowel sounds present normoactive all 4 quads, free of falls or injury, pt ambulated to bathroom with no problems, able to make all needs known, continent of bowel and bladder, poc reviewed, pt verbalized understanding. Safety maintained throughout shift. Will cont to monitor.

## 2018-12-26 NOTE — SUBJECTIVE & OBJECTIVE
Interval History: feels well today. Stated she ambulated to bathroom and took a shower.     Review of Systems   Constitutional: Negative for chills and fever.   Respiratory: Negative.    Cardiovascular: Negative.    Gastrointestinal: Negative for nausea and vomiting.   Endocrine: Negative.    Genitourinary: Negative.    Musculoskeletal: Negative.    Skin: Positive for rash.   Neurological: Negative.    Hematological: Negative.    Psychiatric/Behavioral: Negative.      Objective:     Vital Signs (Most Recent):  Temp: 99.4 °F (37.4 °C) (12/26/18 1148)  Pulse: 64 (12/26/18 1148)  Resp: 17 (12/26/18 1148)  BP: (!) 92/55 (12/26/18 1148)  SpO2: 97 % (12/26/18 1148) Vital Signs (24h Range):  Temp:  [97.4 °F (36.3 °C)-99.4 °F (37.4 °C)] 99.4 °F (37.4 °C)  Pulse:  [64-78] 64  Resp:  [17-18] 17  SpO2:  [95 %-98 %] 97 %  BP: ()/(49-63) 92/55     Weight: (!) 160.8 kg (354 lb 8 oz)  Body mass index is 57.22 kg/m².    Intake/Output Summary (Last 24 hours) at 12/26/2018 1251  Last data filed at 12/26/2018 0800  Gross per 24 hour   Intake 720 ml   Output --   Net 720 ml      Physical Exam   Constitutional: She is oriented to person, place, and time. She appears well-developed. No distress.   Non toxic appearing  Morbidly obese   HENT:   Head: Normocephalic and atraumatic.   Mouth/Throat: Oropharynx is clear and moist.   Eyes: Conjunctivae and EOM are normal.   Neck: Normal range of motion.   Cardiovascular: Normal rate and regular rhythm.   Pulmonary/Chest: Effort normal and breath sounds normal.   Abdominal: Soft. Bowel sounds are normal.   Musculoskeletal: Normal range of motion.   Erythema right thigh. Color is less intense but unchanged in size  No fluctuation  Mild edema   Neurological: She is alert and oriented to person, place, and time.   Skin: She is not diaphoretic.   Psychiatric: She has a normal mood and affect. Her behavior is normal. Judgment and thought content normal.   Nursing note and vitals  reviewed.      Significant Labs: All pertinent labs within the past 24 hours have been reviewed.    Significant Imaging: I have reviewed all pertinent imaging results/findings within the past 24 hours.  I have reviewed and interpreted all pertinent imaging results/findings within the past 24 hours.

## 2018-12-26 NOTE — PROGRESS NOTES
"TN met with patient at bedside to introduce self as  and to discuss duties of case management.  TN reviewed   "[facility]", "Discharge Planning Begins on Admission"  and discussed "Help at Home". Patient stated her spouse Michel can assist her at home. Patient plans to discharge home when ready. TN also discussed her responsibilities to manage her health at home. Patient was informed to leave folder at bedside during hospital stay. Contact information added to white board.      "

## 2018-12-26 NOTE — NURSING
Recvd report, assumed care, pt resting in bed watching TV, NAD or s/s of pain, some swelling noted to L. FA, Lungs clear bilateral and regular heart sounds. Skin warm, dry, and blanchable. VSS, will continue to monitor.

## 2018-12-26 NOTE — PROCEDURES
"Inga Woods is a 48 y.o. female patient.    Temp: 99.4 °F (37.4 °C) (12/26/18 1148)  Pulse: 64 (12/26/18 1148)  Resp: 17 (12/26/18 1148)  BP: (!) 92/55 (12/26/18 1148)  SpO2: 97 % (12/26/18 1148)  Weight: (!) 160.8 kg (354 lb 8 oz) (12/25/18 0032)  Height: 5' 6" (167.6 cm) (12/25/18 0032)    PICC  Date/Time: 12/26/2018 2:50 PM  Performed by: Reinaldo Lora RN  Consent Done: Yes  Time out: Immediately prior to procedure a time out was called to verify the correct patient, procedure, equipment, support staff and site/side marked as required  Indications: med administration and vascular access  Anesthesia: local infiltration  Anesthetic Total (mL): 5  Description of findings: midline  Preparation: skin prepped with chlorhexidine (without alcohol)  Skin prep agent dried: skin prep agent completely dried prior to procedure  Sterile barriers: all five maximum sterile barriers used - cap, mask, sterile gown, sterile gloves, and large sterile sheet  Hand hygiene: hand hygiene performed prior to central venous catheter insertion  Catheter type: single lumen  Catheter size: 4 Fr  Catheter Length: 15cm    Ultrasound guidance: yes  Vessel Caliber: medium and patent, compressibility normal  Vascular Doppler: not done  Needle advanced into vessel with real time Ultrasound guidance.  Guidewire confirmed in vessel.  Sterile sheath used.  no esophageal manometryNumber of attempts: 1  Post-procedure: blood return through all ports, chlorhexidine patch and sterile dressing applied  Estimated blood loss (mL): 0  Specimens: No  Implants: No  Assessment: successful placement  Complications: none  Comments: 15cm midline           Reinaldo Lora  12/26/2018  "

## 2018-12-26 NOTE — ASSESSMENT & PLAN NOTE
BP low but asymptomatic  Hold BP meds and continue with IVFs for now  Good appetite and PO intake

## 2018-12-26 NOTE — NURSING
Pt has remained free from falls or injuries this shift. Bed is locked and low. Side rails up x 2 with call light within reach. Will continue to monitor.

## 2018-12-26 NOTE — PROGRESS NOTES
"[facility] Hospital Medicine  Progress Note    Patient Name: Inga Woods  MRN: [id number]  Patient Class: IP- Inpatient   Admission Date: 12/24/2018  Length of Stay: 2 days  Attending Physician: Aydee Figueroa MD  Primary Care Provider: Sharmin Yarbrough        Subjective:     Principal Problem:Sepsis due to cellulitis    HPI:  48 year old female with morbid obesity, hypertension, cholelithiasis, fatty liver, unstable ambulation and history of lower extremity cellulitis who presented with worsening right thigh pain and redness. Patient stated she's had cellulitis to both of her legs before. Has required hospitalization in the past. Reports associated subjective fever, chills, "shakes", all improved prior to arrival. Also reported nausea and one episode of emesis.     In the ED, patient was afebrile but hypotensive, tachycardic with leukocytosis and erythema or right thigh. Admitted for management of sepsis 2/2 cellulitis.    Hospital Course:  No notes on file    Interval History: feels well today. Stated she ambulated to bathroom and took a shower.     Review of Systems   Constitutional: Negative for chills and fever.   Respiratory: Negative.    Cardiovascular: Negative.    Gastrointestinal: Negative for nausea and vomiting.   Endocrine: Negative.    Genitourinary: Negative.    Musculoskeletal: Negative.    Skin: Positive for rash.   Neurological: Negative.    Hematological: Negative.    Psychiatric/Behavioral: Negative.      Objective:     Vital Signs (Most Recent):  Temp: 99.4 °F (37.4 °C) (12/26/18 1148)  Pulse: 64 (12/26/18 1148)  Resp: 17 (12/26/18 1148)  BP: (!) 92/55 (12/26/18 1148)  SpO2: 97 % (12/26/18 1148) Vital Signs (24h Range):  Temp:  [97.4 °F (36.3 °C)-99.4 °F (37.4 °C)] 99.4 °F (37.4 °C)  Pulse:  [64-78] 64  Resp:  [17-18] 17  SpO2:  [95 %-98 %] 97 %  BP: ()/(49-63) 92/55     Weight: (!) 160.8 kg (354 lb 8 oz)  Body mass index is 57.22 kg/m².    Intake/Output Summary " (Last 24 hours) at 12/26/2018 1251  Last data filed at 12/26/2018 0800  Gross per 24 hour   Intake 720 ml   Output --   Net 720 ml      Physical Exam   Constitutional: She is oriented to person, place, and time. She appears well-developed. No distress.   Non toxic appearing  Morbidly obese   HENT:   Head: Normocephalic and atraumatic.   Mouth/Throat: Oropharynx is clear and moist.   Eyes: Conjunctivae and EOM are normal.   Neck: Normal range of motion.   Cardiovascular: Normal rate and regular rhythm.   Pulmonary/Chest: Effort normal and breath sounds normal.   Abdominal: Soft. Bowel sounds are normal.   Musculoskeletal: Normal range of motion.   Erythema right thigh. Color is less intense but unchanged in size  No fluctuation  Mild edema   Neurological: She is alert and oriented to person, place, and time.   Skin: She is not diaphoretic.   Psychiatric: She has a normal mood and affect. Her behavior is normal. Judgment and thought content normal.   Nursing note and vitals reviewed.      Significant Labs: All pertinent labs within the past 24 hours have been reviewed.    Significant Imaging: I have reviewed all pertinent imaging results/findings within the past 24 hours.  I have reviewed and interpreted all pertinent imaging results/findings within the past 24 hours.    Assessment/Plan:      * Sepsis due to cellulitis    Met criteria for sepsis with tachycardia, leukocytosis and source for infection  With evidence of cellulitis of right thigh  No fluctuation to suspect abscess. Does not appear to involve joint or groin area  Area marked in the ED. Already improving in terms of color intensity. Size grossly unchanged  US negative for DVT  On vanc and zosyn. BCx NGTD           Acquired hypothyroidism    No acute issues  Continue home regimen       Essential hypertension    BP low but asymptomatic  Hold BP meds and continue with IVFs for now  Good appetite and PO intake       Morbid obesity    Spent > 5 minutes on  weight loss education         VTE Risk Mitigation (From admission, onward)        Ordered     enoxaparin injection 40 mg  Every 12 hours      12/25/18 0032     IP VTE HIGH RISK PATIENT  Once      12/25/18 0032          Home 1-2 days    Aydee Holden MD  Department of Hospital Medicine   [facility]

## 2018-12-26 NOTE — NURSING
Notified physician of infiltrated peripheral access, was granted a midline order, notified Jonah Pizano. Swelling was noted, IV was removed and gauges/tape applied. Will cont to monitor.

## 2018-12-26 NOTE — ASSESSMENT & PLAN NOTE
Met criteria for sepsis with tachycardia, leukocytosis and source for infection  With evidence of cellulitis of right thigh  No fluctuation to suspect abscess. Does not appear to involve joint or groin area  Area marked in the ED. Already improving in terms of color intensity. Size grossly unchanged  US negative for DVT  On vanc and zosyn. BCx NGTD

## 2018-12-26 NOTE — NURSING
IV access placed by house sup with ultrasound, 1 attempt, #18g, R. Upper arm infusing abx, pt tolerated well. Will continue to monitor

## 2018-12-27 VITALS
TEMPERATURE: 98 F | HEIGHT: 66 IN | OXYGEN SATURATION: 100 % | BODY MASS INDEX: 47.09 KG/M2 | DIASTOLIC BLOOD PRESSURE: 58 MMHG | WEIGHT: 293 LBS | HEART RATE: 49 BPM | RESPIRATION RATE: 17 BRPM | SYSTOLIC BLOOD PRESSURE: 125 MMHG

## 2018-12-27 LAB
ANION GAP SERPL CALC-SCNC: 5 MMOL/L
BUN SERPL-MCNC: 24 MG/DL
CALCIUM SERPL-MCNC: 8.3 MG/DL
CHLORIDE SERPL-SCNC: 108 MMOL/L
CO2 SERPL-SCNC: 24 MMOL/L
CREAT SERPL-MCNC: 1 MG/DL
EST. GFR  (AFRICAN AMERICAN): >60 ML/MIN/1.73 M^2
EST. GFR  (NON AFRICAN AMERICAN): >60 ML/MIN/1.73 M^2
GLUCOSE SERPL-MCNC: 82 MG/DL
POTASSIUM SERPL-SCNC: 3.4 MMOL/L
SODIUM SERPL-SCNC: 137 MMOL/L
VANCOMYCIN TROUGH SERPL-MCNC: 25.1 UG/ML

## 2018-12-27 PROCEDURE — 63600175 PHARM REV CODE 636 W HCPCS: Performed by: EMERGENCY MEDICINE

## 2018-12-27 PROCEDURE — 25000003 PHARM REV CODE 250: Performed by: EMERGENCY MEDICINE

## 2018-12-27 PROCEDURE — 25000003 PHARM REV CODE 250: Performed by: INTERNAL MEDICINE

## 2018-12-27 PROCEDURE — 80048 BASIC METABOLIC PNL TOTAL CA: CPT

## 2018-12-27 RX ORDER — LISINOPRIL AND HYDROCHLOROTHIAZIDE 20; 25 MG/1; MG/1
1 TABLET ORAL DAILY
Status: ON HOLD | COMMUNITY
End: 2018-12-27 | Stop reason: SDUPTHER

## 2018-12-27 RX ORDER — CEPHALEXIN 500 MG/1
500 CAPSULE ORAL EVERY 6 HOURS
Qty: 12 CAPSULE | Refills: 0 | Status: SHIPPED | OUTPATIENT
Start: 2018-12-28 | End: 2018-12-31

## 2018-12-27 RX ORDER — LISINOPRIL AND HYDROCHLOROTHIAZIDE 20; 25 MG/1; MG/1
1 TABLET ORAL DAILY PRN
Start: 2018-12-27 | End: 2019-11-25

## 2018-12-27 RX ADMIN — ENOXAPARIN SODIUM 40 MG: 100 INJECTION SUBCUTANEOUS at 08:12

## 2018-12-27 RX ADMIN — ACETAMINOPHEN 650 MG: 325 TABLET ORAL at 01:12

## 2018-12-27 RX ADMIN — PIPERACILLIN AND TAZOBACTAM 4.5 G: 4; .5 INJECTION, POWDER, LYOPHILIZED, FOR SOLUTION INTRAVENOUS; PARENTERAL at 01:12

## 2018-12-27 RX ADMIN — SODIUM CHLORIDE: 0.9 INJECTION, SOLUTION INTRAVENOUS at 08:12

## 2018-12-27 RX ADMIN — LEVOTHYROXINE SODIUM 125 MCG: 125 TABLET ORAL at 05:12

## 2018-12-27 RX ADMIN — PIPERACILLIN AND TAZOBACTAM 4.5 G: 4; .5 INJECTION, POWDER, LYOPHILIZED, FOR SOLUTION INTRAVENOUS; PARENTERAL at 08:12

## 2018-12-27 NOTE — PLAN OF CARE
12/27/18 1113   Medicare Message   Important Message from Medicare regarding Discharge Appeal Rights Given to patient/caregiver;Signed/date by patient/caregiver;Explained to patient/caregiver   Date IMM was signed 12/27/18   Time IMM was signed 1108   Copy provided to patient.

## 2018-12-27 NOTE — PROGRESS NOTES
PCP appointment scheduled.    Follow-up Information     Sharmin Yarbrough On 1/8/2019.    Why:  On Tuesday @ 9:30am Address: [address], [place], - [phone]

## 2018-12-27 NOTE — NURSING
recvd report on pt, assumed care. Pt resting in be quietly, NAD, or pain noted. Midline access drsg C/D/I, no redness or swelling. Tolerating abx well. Will cont to monitor.

## 2018-12-27 NOTE — PLAN OF CARE
"TN reviewed follow up appointment information as well as  "Cellulitis discharge instructions" handout with patient using teach back. Patient stated she will notify the doctor if she has a fever, redness and swelling to extremities.  Patient is in agreement and verbalized an understanding. Placed discharge information in blue discharge folder. TN also reviewed patient responsibility checklist with her using teach back. Patient was able to verbalize her responsibilities after discharge to manage her care at home bein. Going to follow up appointments   2. Picking up rx from the pharmacy when discharged  3. Taking her medications as prescribed     Patient stated her  Michel is her help at home. Patient discharging home.    Patient's nurse, Majo, informed that patient can discharge from  standpoint.        18 1103   Final Note   Assessment Type Final Discharge Note   Anticipated Discharge Disposition Home   What phone number can be called within the next 1-3 days to see how you are doing after discharge? ([phone])   Hospital Follow Up  Appt(s) scheduled? Yes   Discharge plans and expectations educations in teach back method with documentation complete? Yes   Right Care Referral Info   Post Acute Recommendation No Care     "

## 2018-12-27 NOTE — PLAN OF CARE
Problem: Adult Inpatient Plan of Care  Goal: Plan of Care Review  Outcome: Ongoing (interventions implemented as appropriate)   12/27/18 8558   Plan of Care Review   Plan of Care Reviewed With patient   Pt remains free of falls and injuries. Redness to R upper thigh, warm to touch, area marked. Vanc trough elevated, tonight's dose held. VSSAF. Ambulates independently. Non skid socks on. Remains on tele box #[id number]. Sinus francis overnight, asymptomatic. Some c/o pain/discomfort overnight, managed well with PRN tylenol. Currently resting appropriately.

## 2018-12-27 NOTE — NURSING
Discharge instructions were given, encouraged putting all paperwork blue folder, educated on s/s of infection, on call nurse hotline and date/time of fu appts. Pt verbalized understanding

## 2018-12-27 NOTE — PROGRESS NOTES
WRITTEN HEALTHCARE DISCHARGE INFORMATION     Things that YOU are responsible for to Manage Your Care At Home:  1. Getting your prescriptions filled.  2. Taking you medications as directed. DO NOT MISS ANY DOSES!  3. Going to your follow-up doctor appointments. This is important because it allows the doctor to monitor your progress and to determine if any changes need to be made to your treatment plan.    If you are unable to make your follow up appointments, please call the number listed and reschedule this appointment.     After discharge, if you need assistance, you can call [facility] On Call Nurse Care Line for 24/7 assistance at [phone]    Thank you for choosing [facility] and allowing us to care for you.   From your care manager:   Xi THAKKAR,[place] @ [phone]     You should receive a call from [facility] Discharge Department within 48-72 hours to help manage your care after discharge. Please try to make sure that you answer your phone for this important phone call.     Follow-up Information     Sharmin Yarbrough On 1/8/2019.    Why:  On Tuesday @ 9:30am Address: [facility] [address], [place], - [phone]

## 2018-12-27 NOTE — DISCHARGE SUMMARY
"[facility] Hospital Medicine  Discharge Summary      Patient Name: Inga Woods  MRN: [id number]  Admission Date: 12/24/2018  Hospital Length of Stay: 3 days  Discharge Date and Time:  12/27/2018 9:03 AM  Attending Physician: Aydee Figueroa MD   Discharging Provider: Aydee Holden MD  Primary Care Provider: Sharmin Yarbrough      HPI:   48 year old female with morbid obesity, hypertension, cholelithiasis, fatty liver, unstable ambulation and history of lower extremity cellulitis who presented with worsening right thigh pain and redness. Patient stated she's had cellulitis to both of her legs before. Has required hospitalization in the past. Reports associated subjective fever, chills, "shakes", all improved prior to arrival. Also reported nausea and one episode of emesis.     In the ED, patient was afebrile but hypotensive, tachycardic with leukocytosis and erythema or right thigh. Admitted for management of sepsis 2/2 cellulitis.    * No surgery found *      Hospital Course:   Ms Woods presented with sepsis secondary to right thigh cellulitis. Held BP meds as BP was low. Had leukocytosis and tachycardia. Started on broad spectrum abx and fluids. Area of erythema marked. Intensity of erythema and warmth improved. US of right leg negative for DVT. Patient improved clinically. Remained ambulatory and independent. Completed a total of 4 days of IV abx. Prescribed cephalexin for 3 more days for a total of 7 days of abx treatment. Also advised on checking BP at home every morning and keeping a log. Is to take BP meds only if BP high as it appears she develops symptomatic hypotension at home. Spent > 5 minutes on weight loss education as obesity is negatively impacting her health. Follow up with PCP within next 7 days. Activity as tolerated. Heart healthy diet.      Final Active Diagnoses:    Diagnosis Date Noted POA    PRINCIPAL PROBLEM:  Sepsis due to cellulitis [L03.90, A41.9] 12/24/2018 Yes    " Acquired hypothyroidism [E03.9] 12/26/2018 Yes    Morbid obesity [E66.01] 12/25/2018 Yes    Essential hypertension [I10] 12/25/2018 Yes      Problems Resolved During this Admission:       Discharged Condition: good    Disposition: Home or Self Care    Follow Up: MAXI to schedule appointment with PCP    Medications:  Reconciled Home Medications:      Medication List      START taking these medications    cephALEXin 500 MG capsule  Commonly known as:  KEFLEX  Take 1 capsule (500 mg total) by mouth every 6 (six) hours. for 3 days  Start taking on:  12/28/2018        CHANGE how you take these medications    lisinopril-hydrochlorothiazide 20-25 mg Tab  Commonly known as:  PRINZIDE,ZESTORETIC  Take 1 tablet by mouth daily as needed. For systolic blood pressure (top number) above 140 mmHg  What changed:    · when to take this  · reasons to take this  · additional instructions        CONTINUE taking these medications    ferrous sulfate 325 (65 FE) MG EC tablet  Take 325 mg by mouth 3 (three) times daily with meals.     levothyroxine 125 MCG tablet  Commonly known as:  SYNTHROID  Take 125 mcg by mouth once daily.     meclizine 12.5 mg tablet  Commonly known as:  ANTIVERT  Take 1 tablet (12.5 mg total) by mouth 3 (three) times daily as needed for Dizziness.     MELATONIN ORAL  Take by mouth.         Indwelling Lines/Drains at time of discharge:   Lines/Drains/Airways          None          Time spent on the discharge of patient: > 35 minutes  Patient was seen and examined on the date of discharge and determined to be suitable for discharge.         Aydee Holden MD  Department of Hospital Medicine  [facility]

## 2018-12-27 NOTE — HOSPITAL COURSE
Ms Woods presented with sepsis secondary to right thigh cellulitis. Held BP meds as BP was low. Had leukocytosis and tachycardia. Started on broad spectrum abx and fluids. Area of erythema marked. Intensity of erythema and warmth improved. US of right leg negative for DVT. Patient improved clinically. Remained ambulatory and independent. Completed a total of 4 days of IV abx. Prescribed cephalexin for 3 more days for a total of 7 days of abx treatment. Also advised on checking BP at home every morning and keeping a log. Is to take BP meds only if BP high as it appears she develops symptomatic hypotension at home. Spent > 5 minutes on weight loss education as obesity is negatively impacting her health. Follow up with PCP within next 7 days. Activity as tolerated. Heart healthy diet.

## 2018-12-27 NOTE — NURSING
Pt Inga Woods MRN [id number] with tele box #[id number] confirmed and verified on tele box and monitor with off going nurse.

## 2018-12-29 LAB
BACTERIA BLD CULT: NORMAL
BACTERIA BLD CULT: NORMAL

## 2019-11-25 ENCOUNTER — HOSPITAL ENCOUNTER (EMERGENCY)
Facility: HOSPITAL | Age: 49
Discharge: HOME | End: 2019-11-25
Attending: EMERGENCY MEDICINE
Payer: MEDICARE

## 2019-11-25 VITALS
HEIGHT: 65 IN | DIASTOLIC BLOOD PRESSURE: 65 MMHG | WEIGHT: 293 LBS | BODY MASS INDEX: 48.82 KG/M2 | TEMPERATURE: 97 F | RESPIRATION RATE: 18 BRPM | SYSTOLIC BLOOD PRESSURE: 95 MMHG | HEART RATE: 67 BPM | OXYGEN SATURATION: 100 %

## 2019-11-25 DIAGNOSIS — R10.13 EPIGASTRIC PAIN: ICD-10-CM

## 2019-11-25 DIAGNOSIS — K80.20 CALCULUS OF GALLBLADDER WITHOUT CHOLECYSTITIS WITHOUT OBSTRUCTION: Primary | ICD-10-CM

## 2019-11-25 LAB
ALBUMIN SERPL BCP-MCNC: 3.7 G/DL (ref 3.5–5.2)
ALP SERPL-CCNC: 104 U/L (ref 55–135)
ALT SERPL W/O P-5'-P-CCNC: 18 U/L (ref 10–44)
ANION GAP SERPL CALC-SCNC: 7 MMOL/L (ref 8–16)
AST SERPL-CCNC: 18 U/L (ref 10–40)
BASOPHILS # BLD AUTO: 0.05 K/UL (ref 0–0.2)
BASOPHILS NFR BLD: 0.7 % (ref 0–1.9)
BILIRUB SERPL-MCNC: 0.3 MG/DL (ref 0.1–1)
BUN SERPL-MCNC: 17 MG/DL (ref 6–20)
CALCIUM SERPL-MCNC: 9.4 MG/DL (ref 8.7–10.5)
CHLORIDE SERPL-SCNC: 108 MMOL/L (ref 95–110)
CO2 SERPL-SCNC: 26 MMOL/L (ref 23–29)
CREAT SERPL-MCNC: 0.8 MG/DL (ref 0.5–1.4)
DIFFERENTIAL METHOD: ABNORMAL
EOSINOPHIL # BLD AUTO: 0.3 K/UL (ref 0–0.5)
EOSINOPHIL NFR BLD: 4 % (ref 0–8)
ERYTHROCYTE [DISTWIDTH] IN BLOOD BY AUTOMATED COUNT: 13.8 % (ref 11.5–14.5)
EST. GFR  (AFRICAN AMERICAN): >60 ML/MIN/1.73 M^2
EST. GFR  (NON AFRICAN AMERICAN): >60 ML/MIN/1.73 M^2
GLUCOSE SERPL-MCNC: 113 MG/DL (ref 70–110)
HCT VFR BLD AUTO: 40.6 % (ref 37–48.5)
HGB BLD-MCNC: 12.7 G/DL (ref 12–16)
IMM GRANULOCYTES # BLD AUTO: 0.02 K/UL (ref 0–0.04)
IMM GRANULOCYTES NFR BLD AUTO: 0.3 % (ref 0–0.5)
LIPASE SERPL-CCNC: 43 U/L (ref 4–60)
LYMPHOCYTES # BLD AUTO: 2 K/UL (ref 1–4.8)
LYMPHOCYTES NFR BLD: 26.2 % (ref 18–48)
MCH RBC QN AUTO: 27.7 PG (ref 27–31)
MCHC RBC AUTO-ENTMCNC: 31.3 G/DL (ref 32–36)
MCV RBC AUTO: 89 FL (ref 82–98)
MONOCYTES # BLD AUTO: 0.7 K/UL (ref 0.3–1)
MONOCYTES NFR BLD: 9.3 % (ref 4–15)
NEUTROPHILS # BLD AUTO: 4.5 K/UL (ref 1.8–7.7)
NEUTROPHILS NFR BLD: 59.5 % (ref 38–73)
NRBC BLD-RTO: 0 /100 WBC
PLATELET # BLD AUTO: 238 K/UL (ref 150–350)
PMV BLD AUTO: 11.2 FL (ref 9.2–12.9)
POTASSIUM SERPL-SCNC: 4 MMOL/L (ref 3.5–5.1)
PROT SERPL-MCNC: 8.3 G/DL (ref 6–8.4)
RBC # BLD AUTO: 4.58 M/UL (ref 4–5.4)
SODIUM SERPL-SCNC: 141 MMOL/L (ref 136–145)
WBC # BLD AUTO: 7.56 K/UL (ref 3.9–12.7)

## 2019-11-25 PROCEDURE — 99285 EMERGENCY DEPT VISIT HI MDM: CPT | Mod: 25

## 2019-11-25 PROCEDURE — 96374 THER/PROPH/DIAG INJ IV PUSH: CPT

## 2019-11-25 PROCEDURE — 63600175 PHARM REV CODE 636 W HCPCS: Performed by: EMERGENCY MEDICINE

## 2019-11-25 PROCEDURE — 93010 EKG 12-LEAD: ICD-10-PCS | Mod: ,,, | Performed by: INTERNAL MEDICINE

## 2019-11-25 PROCEDURE — 80053 COMPREHEN METABOLIC PANEL: CPT

## 2019-11-25 PROCEDURE — 93010 ELECTROCARDIOGRAM REPORT: CPT | Mod: ,,, | Performed by: INTERNAL MEDICINE

## 2019-11-25 PROCEDURE — 96375 TX/PRO/DX INJ NEW DRUG ADDON: CPT | Mod: 59

## 2019-11-25 PROCEDURE — 93005 ELECTROCARDIOGRAM TRACING: CPT

## 2019-11-25 PROCEDURE — 85025 COMPLETE CBC W/AUTO DIFF WBC: CPT

## 2019-11-25 PROCEDURE — 83690 ASSAY OF LIPASE: CPT

## 2019-11-25 RX ORDER — OMEPRAZOLE 20 MG/1
20 CAPSULE, DELAYED RELEASE ORAL
COMMUNITY

## 2019-11-25 RX ORDER — CITALOPRAM 40 MG/1
40 TABLET, FILM COATED ORAL
COMMUNITY
Start: 2019-10-17

## 2019-11-25 RX ORDER — LEVOTHYROXINE SODIUM 175 UG/1
175 TABLET ORAL DAILY
COMMUNITY
Start: 2019-10-31

## 2019-11-25 RX ORDER — DICYCLOMINE HYDROCHLORIDE 20 MG/1
20 TABLET ORAL 2 TIMES DAILY
Qty: 20 TABLET | Refills: 0 | Status: SHIPPED | OUTPATIENT
Start: 2019-11-25 | End: 2019-12-25

## 2019-11-25 RX ORDER — HYDROCHLOROTHIAZIDE 25 MG/1
25 TABLET ORAL
COMMUNITY
Start: 2019-10-17 | End: 2020-10-16

## 2019-11-25 RX ORDER — MELOXICAM 7.5 MG/1
7.5 TABLET ORAL DAILY
Qty: 20 TABLET | Refills: 0 | Status: SHIPPED | OUTPATIENT
Start: 2019-11-25

## 2019-11-25 RX ORDER — ONDANSETRON 2 MG/ML
4 INJECTION INTRAMUSCULAR; INTRAVENOUS
Status: COMPLETED | OUTPATIENT
Start: 2019-11-25 | End: 2019-11-25

## 2019-11-25 RX ORDER — SERTRALINE HYDROCHLORIDE 50 MG/1
50 TABLET, FILM COATED ORAL
COMMUNITY
Start: 2019-11-11 | End: 2020-05-09

## 2019-11-25 RX ORDER — KETOROLAC TROMETHAMINE 30 MG/ML
15 INJECTION, SOLUTION INTRAMUSCULAR; INTRAVENOUS
Status: COMPLETED | OUTPATIENT
Start: 2019-11-25 | End: 2019-11-25

## 2019-11-25 RX ADMIN — ONDANSETRON HYDROCHLORIDE 4 MG: 2 SOLUTION INTRAMUSCULAR; INTRAVENOUS at 09:11

## 2019-11-25 RX ADMIN — KETOROLAC TROMETHAMINE 15 MG: 30 INJECTION, SOLUTION INTRAMUSCULAR at 09:11

## 2019-11-26 ENCOUNTER — HOSPITAL ENCOUNTER (OUTPATIENT)
Facility: HOSPITAL | Age: 49
Discharge: HOME | End: 2019-11-27
Attending: EMERGENCY MEDICINE | Admitting: SURGERY
Payer: MEDICARE

## 2019-11-26 DIAGNOSIS — K81.9 CHOLECYSTITIS: ICD-10-CM

## 2019-11-26 DIAGNOSIS — R10.11 RIGHT UPPER QUADRANT PAIN: ICD-10-CM

## 2019-11-26 DIAGNOSIS — R50.9 FEVER, UNSPECIFIED FEVER CAUSE: ICD-10-CM

## 2019-11-26 DIAGNOSIS — L03.115 CELLULITIS OF RIGHT LOWER EXTREMITY: Primary | ICD-10-CM

## 2019-11-26 PROBLEM — K80.20 GALL BLADDER STONES: Status: ACTIVE | Noted: 2019-11-26

## 2019-11-26 LAB
ALBUMIN SERPL BCP-MCNC: 3.6 G/DL (ref 3.5–5.2)
ALP SERPL-CCNC: 113 U/L (ref 55–135)
ALT SERPL W/O P-5'-P-CCNC: 16 U/L (ref 10–44)
ANION GAP SERPL CALC-SCNC: 9 MMOL/L (ref 8–16)
AST SERPL-CCNC: 24 U/L (ref 10–40)
B-HCG UR QL: NEGATIVE
BACTERIA #/AREA URNS HPF: ABNORMAL /HPF
BASOPHILS # BLD AUTO: 0.04 K/UL (ref 0–0.2)
BASOPHILS NFR BLD: 0.3 % (ref 0–1.9)
BILIRUB SERPL-MCNC: 0.4 MG/DL (ref 0.1–1)
BILIRUB UR QL STRIP: NEGATIVE
BUN SERPL-MCNC: 18 MG/DL (ref 6–20)
CALCIUM SERPL-MCNC: 9 MG/DL (ref 8.7–10.5)
CHLORIDE SERPL-SCNC: 103 MMOL/L (ref 95–110)
CLARITY UR: ABNORMAL
CO2 SERPL-SCNC: 23 MMOL/L (ref 23–29)
COLOR UR: YELLOW
CREAT SERPL-MCNC: 0.9 MG/DL (ref 0.5–1.4)
CTP QC/QA: YES
DIFFERENTIAL METHOD: ABNORMAL
EOSINOPHIL # BLD AUTO: 0 K/UL (ref 0–0.5)
EOSINOPHIL NFR BLD: 0.2 % (ref 0–8)
ERYTHROCYTE [DISTWIDTH] IN BLOOD BY AUTOMATED COUNT: 13.6 % (ref 11.5–14.5)
EST. GFR  (AFRICAN AMERICAN): >60 ML/MIN/1.73 M^2
EST. GFR  (NON AFRICAN AMERICAN): >60 ML/MIN/1.73 M^2
GLUCOSE SERPL-MCNC: 90 MG/DL (ref 70–110)
GLUCOSE UR QL STRIP: NEGATIVE
HCT VFR BLD AUTO: 41.4 % (ref 37–48.5)
HGB BLD-MCNC: 12.5 G/DL (ref 12–16)
HGB UR QL STRIP: ABNORMAL
HYALINE CASTS #/AREA URNS LPF: 0 /LPF
IMM GRANULOCYTES # BLD AUTO: 0.06 K/UL (ref 0–0.04)
IMM GRANULOCYTES NFR BLD AUTO: 0.5 % (ref 0–0.5)
KETONES UR QL STRIP: NEGATIVE
LACTATE SERPL-SCNC: 2.1 MMOL/L (ref 0.5–2.2)
LACTATE SERPL-SCNC: 2.5 MMOL/L (ref 0.5–2.2)
LEUKOCYTE ESTERASE UR QL STRIP: ABNORMAL
LIPASE SERPL-CCNC: 24 U/L (ref 4–60)
LYMPHOCYTES # BLD AUTO: 0.5 K/UL (ref 1–4.8)
LYMPHOCYTES NFR BLD: 4.1 % (ref 18–48)
MCH RBC QN AUTO: 27 PG (ref 27–31)
MCHC RBC AUTO-ENTMCNC: 30.2 G/DL (ref 32–36)
MCV RBC AUTO: 89 FL (ref 82–98)
MICROSCOPIC COMMENT: ABNORMAL
MONOCYTES # BLD AUTO: 0.6 K/UL (ref 0.3–1)
MONOCYTES NFR BLD: 5.2 % (ref 4–15)
NEUTROPHILS # BLD AUTO: 10.9 K/UL (ref 1.8–7.7)
NEUTROPHILS NFR BLD: 89.7 % (ref 38–73)
NITRITE UR QL STRIP: NEGATIVE
NRBC BLD-RTO: 0 /100 WBC
PH UR STRIP: 5 [PH] (ref 5–8)
PLATELET # BLD AUTO: 204 K/UL (ref 150–350)
PMV BLD AUTO: 11.4 FL (ref 9.2–12.9)
POTASSIUM SERPL-SCNC: 4.1 MMOL/L (ref 3.5–5.1)
PROT SERPL-MCNC: 8.1 G/DL (ref 6–8.4)
PROT UR QL STRIP: ABNORMAL
RBC # BLD AUTO: 4.63 M/UL (ref 4–5.4)
RBC #/AREA URNS HPF: >100 /HPF (ref 0–4)
SODIUM SERPL-SCNC: 135 MMOL/L (ref 136–145)
SP GR UR STRIP: 1.02 (ref 1–1.03)
SQUAMOUS #/AREA URNS HPF: 2 /HPF
URN SPEC COLLECT METH UR: ABNORMAL
UROBILINOGEN UR STRIP-ACNC: NEGATIVE EU/DL
WBC # BLD AUTO: 12.13 K/UL (ref 3.9–12.7)
WBC #/AREA URNS HPF: 5 /HPF (ref 0–5)

## 2019-11-26 PROCEDURE — G0378 HOSPITAL OBSERVATION PER HR: HCPCS

## 2019-11-26 PROCEDURE — 87040 BLOOD CULTURE FOR BACTERIA: CPT

## 2019-11-26 PROCEDURE — 96372 THER/PROPH/DIAG INJ SC/IM: CPT | Mod: 59

## 2019-11-26 PROCEDURE — 81000 URINALYSIS NONAUTO W/SCOPE: CPT

## 2019-11-26 PROCEDURE — 85025 COMPLETE CBC W/AUTO DIFF WBC: CPT

## 2019-11-26 PROCEDURE — 83690 ASSAY OF LIPASE: CPT

## 2019-11-26 PROCEDURE — 96365 THER/PROPH/DIAG IV INF INIT: CPT

## 2019-11-26 PROCEDURE — 99285 EMERGENCY DEPT VISIT HI MDM: CPT | Mod: 25

## 2019-11-26 PROCEDURE — 80053 COMPREHEN METABOLIC PANEL: CPT

## 2019-11-26 PROCEDURE — 63600175 PHARM REV CODE 636 W HCPCS: Performed by: EMERGENCY MEDICINE

## 2019-11-26 PROCEDURE — 81025 URINE PREGNANCY TEST: CPT | Performed by: EMERGENCY MEDICINE

## 2019-11-26 PROCEDURE — 25000003 PHARM REV CODE 250: Performed by: EMERGENCY MEDICINE

## 2019-11-26 PROCEDURE — 63600175 PHARM REV CODE 636 W HCPCS: Performed by: STUDENT IN AN ORGANIZED HEALTH CARE EDUCATION/TRAINING PROGRAM

## 2019-11-26 PROCEDURE — 83605 ASSAY OF LACTIC ACID: CPT

## 2019-11-26 PROCEDURE — 96361 HYDRATE IV INFUSION ADD-ON: CPT

## 2019-11-26 RX ORDER — IBUPROFEN 400 MG/1
400 TABLET ORAL
Status: COMPLETED | OUTPATIENT
Start: 2019-11-26 | End: 2019-11-26

## 2019-11-26 RX ORDER — SODIUM CHLORIDE 0.9 % (FLUSH) 0.9 %
10 SYRINGE (ML) INJECTION
Status: DISCONTINUED | OUTPATIENT
Start: 2019-11-26 | End: 2019-11-27 | Stop reason: HOSPADM

## 2019-11-26 RX ORDER — HYDROCHLOROTHIAZIDE 25 MG/1
25 TABLET ORAL DAILY
Status: DISCONTINUED | OUTPATIENT
Start: 2019-11-27 | End: 2019-11-27 | Stop reason: HOSPADM

## 2019-11-26 RX ORDER — IPRATROPIUM BROMIDE AND ALBUTEROL SULFATE 2.5; .5 MG/3ML; MG/3ML
3 SOLUTION RESPIRATORY (INHALATION) EVERY 4 HOURS PRN
Status: DISCONTINUED | OUTPATIENT
Start: 2019-11-26 | End: 2019-11-27 | Stop reason: HOSPADM

## 2019-11-26 RX ORDER — TALC
3 POWDER (GRAM) TOPICAL NIGHTLY PRN
Status: DISCONTINUED | OUTPATIENT
Start: 2019-11-26 | End: 2019-11-27 | Stop reason: HOSPADM

## 2019-11-26 RX ORDER — SERTRALINE HYDROCHLORIDE 50 MG/1
50 TABLET, FILM COATED ORAL DAILY
Status: DISCONTINUED | OUTPATIENT
Start: 2019-11-27 | End: 2019-11-27 | Stop reason: HOSPADM

## 2019-11-26 RX ORDER — ACETAMINOPHEN 325 MG/1
650 TABLET ORAL EVERY 8 HOURS PRN
Status: DISCONTINUED | OUTPATIENT
Start: 2019-11-26 | End: 2019-11-27 | Stop reason: HOSPADM

## 2019-11-26 RX ORDER — ACETAMINOPHEN 500 MG
1000 TABLET ORAL
Status: COMPLETED | OUTPATIENT
Start: 2019-11-26 | End: 2019-11-26

## 2019-11-26 RX ORDER — CITALOPRAM 20 MG/1
40 TABLET, FILM COATED ORAL DAILY
Status: DISCONTINUED | OUTPATIENT
Start: 2019-11-27 | End: 2019-11-27 | Stop reason: HOSPADM

## 2019-11-26 RX ORDER — MECLIZINE HCL 12.5 MG 12.5 MG/1
12.5 TABLET ORAL 3 TIMES DAILY PRN
Status: DISCONTINUED | OUTPATIENT
Start: 2019-11-26 | End: 2019-11-27 | Stop reason: HOSPADM

## 2019-11-26 RX ORDER — PANTOPRAZOLE SODIUM 40 MG/1
40 TABLET, DELAYED RELEASE ORAL DAILY
Status: DISCONTINUED | OUTPATIENT
Start: 2019-11-27 | End: 2019-11-27 | Stop reason: HOSPADM

## 2019-11-26 RX ORDER — ACETAMINOPHEN 325 MG/1
650 TABLET ORAL EVERY 4 HOURS PRN
Status: DISCONTINUED | OUTPATIENT
Start: 2019-11-26 | End: 2019-11-27 | Stop reason: HOSPADM

## 2019-11-26 RX ORDER — OXYCODONE HYDROCHLORIDE 5 MG/1
5 TABLET ORAL EVERY 4 HOURS PRN
Status: DISCONTINUED | OUTPATIENT
Start: 2019-11-26 | End: 2019-11-27 | Stop reason: HOSPADM

## 2019-11-26 RX ORDER — HYDRALAZINE HYDROCHLORIDE 20 MG/ML
10 INJECTION INTRAMUSCULAR; INTRAVENOUS EVERY 6 HOURS PRN
Status: DISCONTINUED | OUTPATIENT
Start: 2019-11-26 | End: 2019-11-27 | Stop reason: HOSPADM

## 2019-11-26 RX ORDER — MORPHINE SULFATE 10 MG/ML
4 INJECTION INTRAMUSCULAR; INTRAVENOUS; SUBCUTANEOUS EVERY 4 HOURS PRN
Status: DISCONTINUED | OUTPATIENT
Start: 2019-11-26 | End: 2019-11-27 | Stop reason: HOSPADM

## 2019-11-26 RX ORDER — SODIUM CHLORIDE, SODIUM LACTATE, POTASSIUM CHLORIDE, CALCIUM CHLORIDE 600; 310; 30; 20 MG/100ML; MG/100ML; MG/100ML; MG/100ML
INJECTION, SOLUTION INTRAVENOUS CONTINUOUS
Status: DISCONTINUED | OUTPATIENT
Start: 2019-11-26 | End: 2019-11-27

## 2019-11-26 RX ORDER — ENOXAPARIN SODIUM 100 MG/ML
40 INJECTION SUBCUTANEOUS EVERY 12 HOURS
Status: DISCONTINUED | OUTPATIENT
Start: 2019-11-26 | End: 2019-11-27 | Stop reason: HOSPADM

## 2019-11-26 RX ORDER — ONDANSETRON 2 MG/ML
4 INJECTION INTRAMUSCULAR; INTRAVENOUS EVERY 8 HOURS PRN
Status: DISCONTINUED | OUTPATIENT
Start: 2019-11-26 | End: 2019-11-27 | Stop reason: HOSPADM

## 2019-11-26 RX ADMIN — ENOXAPARIN SODIUM 40 MG: 100 INJECTION SUBCUTANEOUS at 11:11

## 2019-11-26 RX ADMIN — SODIUM CHLORIDE, SODIUM LACTATE, POTASSIUM CHLORIDE, AND CALCIUM CHLORIDE: .6; .31; .03; .02 INJECTION, SOLUTION INTRAVENOUS at 11:11

## 2019-11-26 RX ADMIN — ACETAMINOPHEN 1000 MG: 500 TABLET ORAL at 06:11

## 2019-11-26 RX ADMIN — PIPERACILLIN AND TAZOBACTAM 4.5 G: 4; .5 INJECTION, POWDER, LYOPHILIZED, FOR SOLUTION INTRAVENOUS; PARENTERAL at 07:11

## 2019-11-26 RX ADMIN — SODIUM CHLORIDE 1000 ML: 0.9 INJECTION, SOLUTION INTRAVENOUS at 08:11

## 2019-11-26 RX ADMIN — IBUPROFEN 400 MG: 400 TABLET, FILM COATED ORAL at 08:11

## 2019-11-26 RX ADMIN — SODIUM CHLORIDE 1000 ML: 0.9 INJECTION, SOLUTION INTRAVENOUS at 06:11

## 2019-11-26 NOTE — ED NOTES
"Patient also is now reporting squeezing in her chest ( "Like a pulling")   Patient directed into triage room for EKG.   "

## 2019-11-26 NOTE — ED PROVIDER NOTES
"Encounter Date: 2019    SCRIBE #1 NOTE: I, Rosettajames Lockwood, am scribing for, and in the presence of,  Sekou Rich MD. I have scribed the following portions of the note - Other sections scribed: HPI, ROS, PE, EKG.       History     Chief Complaint   Patient presents with    Abdominal Pain     Pt reports middle abdominal pain that began after she ate, between 4-5pm. Reports pain feels like labor pains. +nausea      CC: Abdominal Pain    HPI: The patient is a 49 y.o female with PMHx of cholelithiasis and GERD who presents to the ED complaining of epigastric and RUQ pain that began about 4 hrs PTA. She states that pain is intermittent and lasts for minutes at a time. Patient states that pain began after eating this afternoon. She reports of Hx of similar pain about 6+ months ago. She also reports of associated nausea without emesis, and a "pulling" pressure or pain in her back and chest. Patient denies diarrhea, fever, chills, congestion, sore throat, cough, or SOB. PSHx of caesarean section, colonoscopy, and upper gastrointestinal endoscopy. NKDA.       The history is provided by the patient. No  was used.     Review of patient's allergies indicates:  No Known Allergies  Past Medical History:   Diagnosis Date    Anemia     Bronchitis     Depression     GERD (gastroesophageal reflux disease)     Hypertension      Past Surgical History:   Procedure Laterality Date     SECTION      COLONOSCOPY      UPPER GASTROINTESTINAL ENDOSCOPY       History reviewed. No pertinent family history.  Social History     Tobacco Use    Smoking status: Never Smoker    Smokeless tobacco: Never Used   Substance Use Topics    Alcohol use: No     Frequency: Never    Drug use: No     Review of Systems   Constitutional: Negative for chills, diaphoresis and fever.   HENT: Negative for congestion and sore throat.    Respiratory: Negative for cough and shortness of breath.    Cardiovascular: Positive " for chest pain (Secondary to abdominal pain).   Gastrointestinal: Positive for abdominal pain and nausea. Negative for diarrhea and vomiting.        No melena.   Genitourinary: Negative for dysuria.   Musculoskeletal: Positive for back pain (Secondary to abdominal pain).   Skin: Negative for rash and wound.   Neurological: Negative for dizziness, speech difficulty, weakness, numbness and headaches.   Psychiatric/Behavioral: Negative for confusion.   All other systems reviewed and are negative.      Physical Exam     Initial Vitals [11/25/19 2018]   BP Pulse Resp Temp SpO2   (!) 197/101 73 18 97.6 °F (36.4 °C) 97 %      MAP       --         Physical Exam    Nursing note and vitals reviewed.  Constitutional: She appears well-developed and well-nourished. She is not diaphoretic. No distress.   HENT:   Head: Normocephalic and atraumatic.   Nose: Nose normal.   Eyes: Conjunctivae and EOM are normal. Pupils are equal, round, and reactive to light. Right eye exhibits no discharge. Left eye exhibits no discharge.   Neck: Normal range of motion. Neck supple. No thyromegaly present.   Cardiovascular: Normal rate, regular rhythm and normal heart sounds.   No murmur heard.  Pulmonary/Chest: Breath sounds normal. No stridor. No respiratory distress. She has no wheezes. She has no rhonchi. She has no rales. She exhibits no tenderness.   Abdominal: Soft. She exhibits no distension and no mass. There is tenderness. There is no rebound and no guarding.   Patient has mild tenderness to the epigastrium and RUQ.    Musculoskeletal: Normal range of motion. She exhibits no edema or tenderness.   Neurological: She is alert and oriented to person, place, and time. She has normal strength. No cranial nerve deficit.   Skin: Skin is warm and dry. No rash noted. No erythema.   Psychiatric: She has a normal mood and affect. Her behavior is normal. Judgment and thought content normal.         ED Course   Procedures  Labs Reviewed   CBC W/ AUTO  DIFFERENTIAL - Abnormal; Notable for the following components:       Result Value    Mean Corpuscular Hemoglobin Conc 31.3 (*)     All other components within normal limits   COMPREHENSIVE METABOLIC PANEL - Abnormal; Notable for the following components:    Glucose 113 (*)     Anion Gap 7 (*)     All other components within normal limits   LIPASE     EKG Readings: (Independently Interpreted)   Initial Reading: No STEMI. Rhythm: Normal Sinus Rhythm. Heart Rate: 79 bpm. Ectopy: No Ectopy. Conduction: Normal. ST Segments: Normal ST Segments. T Waves: Normal. Clinical Impression: Normal Sinus Rhythm       Imaging Results          US Abdomen Limited (Final result)  Result time 11/25/19 22:21:43    Final result by Kenny Hanson MD (11/25/19 22:21:43)                 Impression:      Cholelithiasis.  No ultrasonographic evidence to suggest acute cholecystitis.      Electronically signed by: Kenny Hanson MD  Date:    11/25/2019  Time:    22:21             Narrative:    EXAMINATION:  US ABDOMEN LIMITED    CLINICAL HISTORY:  ruq pain;    TECHNIQUE:  Limited ultrasound of the right upper quadrant of the abdomen was performed.    COMPARISON:  December 2018.    FINDINGS:  Hepatic parenchyma is homogeneous without evidence for masses.  No intra- or extrahepatic biliary ductal dilatation. The common bile duct measures 0.3 cm.  Numerous mobile stones are seen within the gallbladder.  No evidence of gallbladder wall thickening or pericholecystic fluid.  Sonographic Tello's sign is negative. Pancreas is obscured by overlying bowel gas.  No evidence of left-sided hydronephrosis.  No ascites.                               X-Ray Chest 1 View (Final result)  Result time 11/25/19 20:57:07    Final result by Alex Martinez MD (11/25/19 20:57:07)                 Impression:      No detrimental change or radiographic acute intrathoracic process seen.  Specifically, no focal consolidation.      Electronically signed by: Alex Martinez  MD  Date:    11/25/2019  Time:    20:57             Narrative:    EXAMINATION:  XR CHEST 1 VIEW    CLINICAL HISTORY:  Epigastric pain    TECHNIQUE:  Single frontal view of the chest was performed.    COMPARISON:  Chest radiograph 12/24/2018    FINDINGS:  No detrimental change.The lungs are clear, with normal appearance of pulmonary vasculature and no pleural effusion or pneumothorax.    The cardiac silhouette is normal in size. The hilar and mediastinal contours are unremarkable.    Bones are intact.  Imaged upper abdomen is within normal limits.                                 Medical Decision Making:   History:   Old Medical Records: I decided to obtain old medical records.  Clinical Tests:   Lab Tests: Ordered and Reviewed  The following lab test(s) were unremarkable: CBC, CMP and Lipase  Radiological Study: Ordered and Reviewed  Medical Tests: Ordered and Reviewed  ED Management:  2230:  Patient is symptoms have improved.  Repeat abdominal exam benign.  Will treat outpatient biliary colic.  Will refer to General surgery.  There is no evidence of cholecystitis.  There is no evidence of biliary tract obstruction.            Scribe Attestation:   Scribe #1: I performed the above scribed service and the documentation accurately describes the services I performed. I attest to the accuracy of the note.              I, Sekou Rich MD, personally performed the services described in this documentation. All medical record entries made by the scribe were at my direction and in my presence.  I have reviewed the chart and agree that the record reflects my personal performance and is accurate and complete.               Clinical Impression:       ICD-10-CM ICD-9-CM   1. Calculus of gallbladder without cholecystitis without obstruction K80.20 574.20   2. Epigastric pain R10.13 789.06         Disposition:   Disposition: Discharged  Condition: Stable                     Sekou Rich MD  11/25/19 2232

## 2019-11-27 VITALS
HEIGHT: 65 IN | OXYGEN SATURATION: 96 % | RESPIRATION RATE: 17 BRPM | DIASTOLIC BLOOD PRESSURE: 67 MMHG | BODY MASS INDEX: 48.82 KG/M2 | TEMPERATURE: 98 F | WEIGHT: 293 LBS | HEART RATE: 83 BPM | SYSTOLIC BLOOD PRESSURE: 121 MMHG

## 2019-11-27 PROBLEM — L03.115 CELLULITIS OF RIGHT LOWER EXTREMITY: Status: ACTIVE | Noted: 2019-11-27

## 2019-11-27 PROBLEM — K81.9 CHOLECYSTITIS: Status: RESOLVED | Noted: 2019-11-26 | Resolved: 2019-11-27

## 2019-11-27 LAB
ALBUMIN SERPL BCP-MCNC: 3 G/DL (ref 3.5–5.2)
ALP SERPL-CCNC: 82 U/L (ref 55–135)
ALT SERPL W/O P-5'-P-CCNC: 15 U/L (ref 10–44)
ANION GAP SERPL CALC-SCNC: 8 MMOL/L (ref 8–16)
AST SERPL-CCNC: 19 U/L (ref 10–40)
BASOPHILS # BLD AUTO: 0.04 K/UL (ref 0–0.2)
BASOPHILS NFR BLD: 0.3 % (ref 0–1.9)
BILIRUB SERPL-MCNC: 0.5 MG/DL (ref 0.1–1)
BUN SERPL-MCNC: 17 MG/DL (ref 6–20)
CALCIUM SERPL-MCNC: 8.1 MG/DL (ref 8.7–10.5)
CHLORIDE SERPL-SCNC: 104 MMOL/L (ref 95–110)
CO2 SERPL-SCNC: 23 MMOL/L (ref 23–29)
CREAT SERPL-MCNC: 0.9 MG/DL (ref 0.5–1.4)
DIFFERENTIAL METHOD: ABNORMAL
EOSINOPHIL # BLD AUTO: 0 K/UL (ref 0–0.5)
EOSINOPHIL NFR BLD: 0.1 % (ref 0–8)
ERYTHROCYTE [DISTWIDTH] IN BLOOD BY AUTOMATED COUNT: 13.7 % (ref 11.5–14.5)
EST. GFR  (AFRICAN AMERICAN): >60 ML/MIN/1.73 M^2
EST. GFR  (NON AFRICAN AMERICAN): >60 ML/MIN/1.73 M^2
GLUCOSE SERPL-MCNC: 96 MG/DL (ref 70–110)
HCT VFR BLD AUTO: 38.8 % (ref 37–48.5)
HGB BLD-MCNC: 11.9 G/DL (ref 12–16)
IMM GRANULOCYTES # BLD AUTO: 0.17 K/UL (ref 0–0.04)
IMM GRANULOCYTES NFR BLD AUTO: 1.1 % (ref 0–0.5)
LACTATE SERPL-SCNC: 2.1 MMOL/L (ref 0.5–2.2)
LYMPHOCYTES # BLD AUTO: 0.5 K/UL (ref 1–4.8)
LYMPHOCYTES NFR BLD: 3.2 % (ref 18–48)
MAGNESIUM SERPL-MCNC: 1.5 MG/DL (ref 1.6–2.6)
MCH RBC QN AUTO: 27.5 PG (ref 27–31)
MCHC RBC AUTO-ENTMCNC: 30.7 G/DL (ref 32–36)
MCV RBC AUTO: 90 FL (ref 82–98)
MONOCYTES # BLD AUTO: 0.3 K/UL (ref 0.3–1)
MONOCYTES NFR BLD: 2 % (ref 4–15)
NEUTROPHILS # BLD AUTO: 14.8 K/UL (ref 1.8–7.7)
NEUTROPHILS NFR BLD: 93.3 % (ref 38–73)
NRBC BLD-RTO: 0 /100 WBC
PHOSPHATE SERPL-MCNC: 3.8 MG/DL (ref 2.7–4.5)
PLATELET # BLD AUTO: 173 K/UL (ref 150–350)
PMV BLD AUTO: 11.1 FL (ref 9.2–12.9)
POTASSIUM SERPL-SCNC: 4.4 MMOL/L (ref 3.5–5.1)
PROT SERPL-MCNC: 7.2 G/DL (ref 6–8.4)
RBC # BLD AUTO: 4.32 M/UL (ref 4–5.4)
SODIUM SERPL-SCNC: 135 MMOL/L (ref 136–145)
WBC # BLD AUTO: 15.81 K/UL (ref 3.9–12.7)

## 2019-11-27 PROCEDURE — G0378 HOSPITAL OBSERVATION PER HR: HCPCS

## 2019-11-27 PROCEDURE — 80053 COMPREHEN METABOLIC PANEL: CPT

## 2019-11-27 PROCEDURE — 96375 TX/PRO/DX INJ NEW DRUG ADDON: CPT

## 2019-11-27 PROCEDURE — 83605 ASSAY OF LACTIC ACID: CPT

## 2019-11-27 PROCEDURE — 96372 THER/PROPH/DIAG INJ SC/IM: CPT

## 2019-11-27 PROCEDURE — 36415 COLL VENOUS BLD VENIPUNCTURE: CPT

## 2019-11-27 PROCEDURE — 25000003 PHARM REV CODE 250: Performed by: STUDENT IN AN ORGANIZED HEALTH CARE EDUCATION/TRAINING PROGRAM

## 2019-11-27 PROCEDURE — 83735 ASSAY OF MAGNESIUM: CPT

## 2019-11-27 PROCEDURE — 84100 ASSAY OF PHOSPHORUS: CPT

## 2019-11-27 PROCEDURE — 96376 TX/PRO/DX INJ SAME DRUG ADON: CPT

## 2019-11-27 PROCEDURE — 85025 COMPLETE CBC W/AUTO DIFF WBC: CPT

## 2019-11-27 PROCEDURE — 63600175 PHARM REV CODE 636 W HCPCS: Performed by: STUDENT IN AN ORGANIZED HEALTH CARE EDUCATION/TRAINING PROGRAM

## 2019-11-27 RX ORDER — DOXYCYCLINE 100 MG/1
100 CAPSULE ORAL EVERY 12 HOURS
Qty: 14 CAPSULE | Refills: 0 | Status: SHIPPED | OUTPATIENT
Start: 2019-11-27 | End: 2019-12-04

## 2019-11-27 RX ORDER — CEPHALEXIN 500 MG/1
500 CAPSULE ORAL EVERY 6 HOURS
Qty: 28 CAPSULE | Refills: 0 | Status: SHIPPED | OUTPATIENT
Start: 2019-11-27 | End: 2019-11-27 | Stop reason: HOSPADM

## 2019-11-27 RX ORDER — LANOLIN ALCOHOL/MO/W.PET/CERES
400 CREAM (GRAM) TOPICAL ONCE
Status: COMPLETED | OUTPATIENT
Start: 2019-11-27 | End: 2019-11-27

## 2019-11-27 RX ADMIN — SERTRALINE HYDROCHLORIDE 50 MG: 50 TABLET ORAL at 09:11

## 2019-11-27 RX ADMIN — Medication 400 MG: at 09:11

## 2019-11-27 RX ADMIN — LEVOTHYROXINE SODIUM 175 MCG: 150 TABLET ORAL at 05:11

## 2019-11-27 RX ADMIN — HYDROCHLOROTHIAZIDE 25 MG: 25 TABLET ORAL at 09:11

## 2019-11-27 RX ADMIN — VANCOMYCIN HYDROCHLORIDE 3000 MG: 1 INJECTION, POWDER, LYOPHILIZED, FOR SOLUTION INTRAVENOUS at 09:11

## 2019-11-27 RX ADMIN — PANTOPRAZOLE SODIUM 40 MG: 40 TABLET, DELAYED RELEASE ORAL at 09:11

## 2019-11-27 RX ADMIN — PIPERACILLIN AND TAZOBACTAM 4.5 G: 4; .5 INJECTION, POWDER, LYOPHILIZED, FOR SOLUTION INTRAVENOUS; PARENTERAL at 02:11

## 2019-11-27 RX ADMIN — CITALOPRAM HYDROBROMIDE 40 MG: 20 TABLET ORAL at 09:11

## 2019-11-27 RX ADMIN — ONDANSETRON HYDROCHLORIDE 4 MG: 2 SOLUTION INTRAMUSCULAR; INTRAVENOUS at 02:11

## 2019-11-27 RX ADMIN — ENOXAPARIN SODIUM 40 MG: 100 INJECTION SUBCUTANEOUS at 09:11

## 2019-11-27 RX ADMIN — ACETAMINOPHEN 650 MG: 325 TABLET ORAL at 11:11

## 2019-11-27 RX ADMIN — PIPERACILLIN AND TAZOBACTAM 4.5 G: 4; .5 INJECTION, POWDER, LYOPHILIZED, FOR SOLUTION INTRAVENOUS; PARENTERAL at 11:11

## 2019-11-27 NOTE — SUBJECTIVE & OBJECTIVE
Past Medical History:   Diagnosis Date    Anemia     Bronchitis     Depression     GERD (gastroesophageal reflux disease)     Hypertension        Past Surgical History:   Procedure Laterality Date     SECTION      COLONOSCOPY      UPPER GASTROINTESTINAL ENDOSCOPY         Review of patient's allergies indicates:  No Known Allergies    No current facility-administered medications on file prior to encounter.      Current Outpatient Medications on File Prior to Encounter   Medication Sig    citalopram (CELEXA) 40 MG tablet Take 40 mg by mouth.    hydroCHLOROthiazide (HYDRODIURIL) 25 MG tablet Take 25 mg by mouth.    levothyroxine (SYNTHROID, LEVOTHROID) 175 MCG tablet Take 175 mcg by mouth once daily.    MELATONIN ORAL Take by mouth.    omeprazole (PRILOSEC) 20 MG capsule Take 20 mg by mouth.    sertraline (ZOLOFT) 50 MG tablet Take 50 mg by mouth.    dicyclomine (BENTYL) 20 mg tablet Take 1 tablet (20 mg total) by mouth 2 (two) times daily.    meclizine (ANTIVERT) 12.5 mg tablet Take 1 tablet (12.5 mg total) by mouth 3 (three) times daily as needed for Dizziness.    meloxicam (MOBIC) 7.5 MG tablet Take 1 tablet (7.5 mg total) by mouth once daily.     Family History     None        Tobacco Use    Smoking status: Never Smoker    Smokeless tobacco: Never Used   Substance and Sexual Activity    Alcohol use: No     Frequency: Never    Drug use: No    Sexual activity: Never     Review of Systems   Constitutional: Negative.    HENT: Negative.    Eyes: Negative.    Respiratory: Negative.    Cardiovascular: Negative.    Gastrointestinal: Negative.    Endocrine: Negative.    Genitourinary: Negative.    Musculoskeletal: Negative.    Skin: Positive for color change.   Neurological: Negative.    Hematological: Negative.    Psychiatric/Behavioral: Negative.      Objective:     Vital Signs (Most Recent):  Temp: 98 °F (36.7 °C) (19 1120)  Pulse: 83 (19)  Resp: 17 (19)  BP:  121/67 (11/27/19 1120)  SpO2: 96 % (11/27/19 1120) Vital Signs (24h Range):  Temp:  [97.3 °F (36.3 °C)-101 °F (38.3 °C)] 98 °F (36.7 °C)  Pulse:  [] 83  Resp:  [17-22] 17  SpO2:  [94 %-100 %] 96 %  BP: (116-180)/(58-89) 121/67     Weight: (!) 172.4 kg (380 lb)  Body mass index is 63.24 kg/m².    Physical Exam   Constitutional: She is oriented to person, place, and time. She appears well-developed. No distress.   Well appearing  Morbidly obese   HENT:   Head: Normocephalic and atraumatic.   Eyes: Conjunctivae and EOM are normal.   Neck: Normal range of motion.   Cardiovascular: Normal rate and regular rhythm.   Pulmonary/Chest: Effort normal and breath sounds normal.   Abdominal: Soft. Bowel sounds are normal.   Musculoskeletal: Normal range of motion.   Neurological: She is alert and oriented to person, place, and time.   Skin: Skin is warm and dry. Capillary refill takes less than 2 seconds. She is not diaphoretic. There is erythema (medial aspect calf with some firmness but bot much tenderness. No fluctuance nor open wounds).   Psychiatric: She has a normal mood and affect. Her behavior is normal. Judgment and thought content normal.   Nursing note and vitals reviewed.      Significant Labs: All pertinent labs within the past 24 hours have been reviewed.    Significant Imaging: I have reviewed all pertinent imaging results/findings within the past 24 hours.  I have reviewed and interpreted all pertinent imaging results/findings within the past 24 hours.

## 2019-11-27 NOTE — PLAN OF CARE
Problem: Fall Injury Risk  Goal: Absence of Fall and Fall-Related Injury  Outcome: Met  Intervention: Identify and Manage Contributors to Fall Injury Risk  Flowsheets (Taken 11/27/2019 1607)  Self-Care Promotion: independence encouraged; BADL personal objects within reach; BADL personal routines maintained  Medication Review/Management: medications reviewed  Intervention: Promote Injury-Free Environment  Flowsheets (Taken 11/27/2019 1607)  Safety Promotion/Fall Prevention: assistive device/personal item within reach; side rails raised x 2  Environmental Safety Modification: assistive device/personal items within reach; clutter free environment maintained; room near unit station     Problem: Infection  Goal: Infection Symptom Resolution  Outcome: Met  Intervention: Prevent or Manage Infection  Flowsheets (Taken 11/27/2019 1607)  Infection Management: aseptic technique maintained     Problem: Adult Inpatient Plan of Care  Goal: Plan of Care Review  Outcome: Met  Goal: Patient-Specific Goal (Individualization)  Outcome: Met  Goal: Absence of Hospital-Acquired Illness or Injury  Outcome: Met  Intervention: Identify and Manage Fall Risk  Flowsheets (Taken 11/27/2019 1607)  Safety Promotion/Fall Prevention: assistive device/personal item within reach; side rails raised x 2  Intervention: Prevent VTE (venous thromboembolism)  Flowsheets (Taken 11/27/2019 1607)  VTE Prevention/Management: fluids promoted  Goal: Optimal Comfort and Wellbeing  Outcome: Met  Intervention: Provide Person-Centered Care  Flowsheets (Taken 11/27/2019 1607)  Trust Relationship/Rapport: care explained; questions answered; questions encouraged  Goal: Readiness for Transition of Care  Outcome: Met  Goal: Rounds/Family Conference  Outcome: Met

## 2019-11-27 NOTE — PROGRESS NOTES
SW called patient's PCP to schedule an appointment. There was no answer. SW left a message instructing them to call patient to schedule follow up appointment.

## 2019-11-27 NOTE — PROGRESS NOTES
Pharmacokinetic Initial Assessment: IV Vancomycin    Assessment/Plan:    Initiate intravenous vancomycin with loading dose of 3000 mg once followed by a maintenance dose of vancomycin 2000 mg IV every q12 hours  Desired empiric serum trough concentration is 10 to 20 mcg/mL  Draw vancomycin trough level 30 min prior to fourth dose on 11/28 at approximately 2015   Pharmacy will continue to follow and monitor vancomycin.      Please contact pharmacy at extension [phone] with any questions regarding this assessment.     Thank you for the consult,   Harinder Dubon       Patient brief summary:  Inga Woods is a 49 y.o. female initiated on antimicrobial therapy with IV Vancomycin for treatment of suspected skin & soft tissue infection    Drug Allergies:   Review of patient's allergies indicates:  No Known Allergies    Actual Body Weight:   172.4 kg    Renal Function:   Estimated Creatinine Clearance: 125.9 mL/min (based on SCr of 0.9 mg/dL).,     Dialysis Method (if applicable):        CBC (last 72 hours):  Recent Labs   Lab Result Units 11/25/19 2102 11/26/19 1834 11/27/19 0217   WBC K/uL 7.56 12.13 15.81*   Hemoglobin g/dL 12.7 12.5 11.9*   Hematocrit % 40.6 41.4 38.8   Platelets K/uL 238 204 173   Gran% % 59.5 89.7* 93.3*   Lymph% % 26.2 4.1* 3.2*   Mono% % 9.3 5.2 2.0*   Eosinophil% % 4.0 0.2 0.1   Basophil% % 0.7 0.3 0.3   Differential Method  Automated Automated Automated       Metabolic Panel (last 72 hours):  Recent Labs   Lab Result Units 11/25/19 2102 11/26/19 1834 11/26/19 2020 11/27/19  0217   Sodium mmol/L 141 135*  --  135*   Potassium mmol/L 4.0 4.1  --  4.4   Chloride mmol/L 108 103  --  104   CO2 mmol/L 26 23  --  23   Glucose mg/dL 113* 90  --  96   Glucose, UA   --   --  Negative  --    BUN, Bld mg/dL 17 18  --  17   Creatinine mg/dL 0.8 0.9  --  0.9   Albumin g/dL 3.7 3.6  --  3.0*   Total Bilirubin mg/dL 0.3 0.4  --  0.5   Alkaline Phosphatase U/L 104 113  --  82   AST U/L 18 24  --  19    ALT U/L 18 16  --  15   Magnesium mg/dL  --   --   --  1.5*   Phosphorus mg/dL  --   --   --  3.8       Drug levels (last 3 results):  No results for input(s): VANCOMYCINRA, VANCOMYCINPE, VANCOMYCINTR in the last 72 hours.    Microbiologic Results:  Microbiology Results (last 7 days)       Procedure Component Value Units Date/Time    Blood Culture #1 **CANNOT BE ORDERED STAT** [[id number]] Collected:  11/26/19 1834    Order Status:  Completed Specimen:  Blood from Peripheral, Forearm, Right Updated:  11/27/19 0312     Blood Culture, Routine No Growth to date    Blood Culture #1 **CANNOT BE ORDERED STAT** [[id number]] Collected:  11/26/19 1900    Order Status:  Completed Specimen:  Blood from Peripheral, Forearm, Left Updated:  11/27/19 0312     Blood Culture, Routine No Growth to date

## 2019-11-27 NOTE — HPI
49 year old female with morbid obesity, hypothyroidism, depression, GERD and hypertension who presented with abdominal pain. Concern for acute cholecystitis therefore admitted to general surgery for possible laparoscopic cholecystectomy. Ultrasound RUQ showed cholelithiasis but no evidence of cholecystitis. TMax on admission 99.2F but no leukocytosis nor cholestatic pattern. Urinalysis abnormal but not consistent with urinary tract infection. Patient's abdominal symptoms resolved and able to tolerate regular diet without issues. On exam patient was noted to have erythema in medial aspect to right lower extremity. Patient stated she did not see this till yesterday. Hospital medicine consulted for recommendations regarding management of cellulitis.

## 2019-11-27 NOTE — ED NOTES
Pt ambulated to the restroom to give urine sample. Pt ambulated back to her room w/out assistance and w/out incident.

## 2019-11-27 NOTE — ED PROVIDER NOTES
Encounter Date: 2019    SCRIBE #1 NOTE: I, Rosetta Lockwood, am scribing for, and in the presence of,  Sekou Rich MD. I have scribed the following portions of the note - Other sections scribed: HPI, ROS, PE.       History     Chief Complaint   Patient presents with    Fever     fever started earlier today. 99.2 in triage. nausea noted. denies SOB, CP, and vomiting.     CC: Fever    HPI: The patient is a 49 y.o female who presents to the ED complaining of a fever that began about 6 hrs PTA. Patient reports of a Tmax of 104 F, with some improvement since onset. She was seen in the ED yesterday with abdominal pain and diagnosed with cholelithiasis. Patient denies any abdominal pain, but reports of malaise, chills, generalized myalgias, HA, decreased appetite, and growling bowel sounds. She states that she began her menstrual period today and denies any menstrual related pain. Patient also denies cough, congestion, sore throat, emesis, diarrhea, and dysuria. Influenza vaccination is UTD. PMHx of lymphedema to bilateral lower extremities with Hx of cellulitis. She states she normally presents with extremity erythema and leg pain, but she denies having similar symptoms today.       .      The history is provided by the patient. No  was used.     Review of patient's allergies indicates:  No Known Allergies  Past Medical History:   Diagnosis Date    Anemia     Bronchitis     Depression     GERD (gastroesophageal reflux disease)     Hypertension      Past Surgical History:   Procedure Laterality Date     SECTION      COLONOSCOPY      UPPER GASTROINTESTINAL ENDOSCOPY       No family history on file.  Social History     Tobacco Use    Smoking status: Never Smoker    Smokeless tobacco: Never Used   Substance Use Topics    Alcohol use: No     Frequency: Never    Drug use: No     Review of Systems   Constitutional: Positive for appetite change (Decreased appetite), chills and  fever. Negative for diaphoresis.        + malaise   HENT: Negative for congestion and sore throat.    Eyes: Negative for visual disturbance.   Respiratory: Negative for cough and shortness of breath.    Cardiovascular: Negative for chest pain.   Gastrointestinal: Negative for abdominal pain, blood in stool, diarrhea and vomiting.        + Growling bowel sounds. No melena.    Genitourinary: Negative for dysuria, flank pain and hematuria.   Musculoskeletal: Positive for arthralgias and myalgias (Generalized). Negative for back pain.   Skin: Negative for rash.   Neurological: Positive for headaches. Negative for dizziness, weakness and numbness.   Psychiatric/Behavioral: Negative for confusion.   All other systems reviewed and are negative.      Physical Exam     Initial Vitals [11/26/19 1809]   BP Pulse Resp Temp SpO2   (!) 180/89 (!) 113 20 99.2 °F (37.3 °C) 96 %      MAP       --         Physical Exam    Nursing note and vitals reviewed.  Constitutional: She appears well-developed and well-nourished. She is not diaphoretic. No distress.   Morbidly obese.   HENT:   Head: Normocephalic and atraumatic.   Nose: Nose normal.   Mouth/Throat: Oropharynx is clear and moist.   Eyes: Conjunctivae and EOM are normal. Pupils are equal, round, and reactive to light. Right eye exhibits no discharge. Left eye exhibits no discharge. No scleral icterus.   Neck: Normal range of motion. Neck supple. No thyromegaly present.   Cardiovascular: Normal rate, regular rhythm and normal heart sounds.   No murmur heard.  Pulmonary/Chest: Breath sounds normal. No stridor. No respiratory distress. She has no wheezes. She has no rhonchi. She has no rales.   Abdominal: Soft. She exhibits no distension and no mass. There is tenderness. There is guarding. There is no rebound.   Tender to RUQ and epigastrium with guarding.    Musculoskeletal: Normal range of motion. She exhibits no edema or tenderness.   Neurological: She is alert and oriented to  person, place, and time. She has normal strength. No cranial nerve deficit. GCS score is 15. GCS eye subscore is 4. GCS verbal subscore is 5. GCS motor subscore is 6.   Skin: Skin is warm and dry. No rash noted. No erythema.   Psychiatric: She has a normal mood and affect. Her behavior is normal. Judgment and thought content normal.         ED Course   Procedures  Labs Reviewed   CBC W/ AUTO DIFFERENTIAL - Abnormal; Notable for the following components:       Result Value    Mean Corpuscular Hemoglobin Conc 30.2 (*)     Gran # (ANC) 10.9 (*)     Immature Grans (Abs) 0.06 (*)     Lymph # 0.5 (*)     Gran% 89.7 (*)     Lymph% 4.1 (*)     All other components within normal limits   COMPREHENSIVE METABOLIC PANEL - Abnormal; Notable for the following components:    Sodium 135 (*)     All other components within normal limits   LACTIC ACID, PLASMA - Abnormal; Notable for the following components:    Lactate (Lactic Acid) 2.5 (*)     All other components within normal limits   CULTURE, BLOOD   CULTURE, BLOOD   LIPASE   URINALYSIS, REFLEX TO URINE CULTURE   LACTIC ACID, PLASMA          Imaging Results    None          Medical Decision Making:   History:   Old Medical Records: I decided to obtain old medical records.  Initial Assessment:   Patient presents with fever body aches started early this afternoon.  Patient was evaluate for cholelithiasis last night.  Patient states she is still pain free.  She denies nausea or vomiting. Patient does have mild to moderate tenderness in right upper quadrant on exam with guarding. Suspect cholecystitis etiology fever.  Will recheck lab work.  Discussed with surgery resident.  Lorenzo repeat imaging if patient's bilirubin has elevated since last night.  Plan to admit on IV antibiotics and scheduled for cholecystectomy.  Differential Diagnosis:   Cholecystitis, choledocholithiasis, pyelonephritis  Clinical Tests:   Lab Tests: Ordered and Reviewed  The following lab test(s) were  unremarkable: CBC, CMP and Lipase  Radiological Study: Reviewed  ED Management:  1950:  No significant change lab work.  Lactate mildly elevated.  Blood culture sent.  Will give IV fluids.  Will repeat lactate 3 hr.  Plan to admit to general surgery for suspected cholecystitis.  Discussed plan with patient.            Scribe Attestation:   Scribe #1: I performed the above scribed service and the documentation accurately describes the services I performed. I attest to the accuracy of the note.              I, Sekou Rich MD, personally performed the services described in this documentation. All medical record entries made by the scribe were at my direction and in my presence.  I have reviewed the chart and agree that the record reflects my personal performance and is accurate and complete.               Clinical Impression:       ICD-10-CM ICD-9-CM   1. Cholecystitis K81.9 575.10   2. Fever, unspecified fever cause R50.9 780.60         Disposition:   Disposition: Placed in Observation  Condition: Stable                     Sekou Rich MD  11/26/19 1953       Sekou Rich MD  11/27/19 0326

## 2019-11-27 NOTE — NURSING
Patient discharged per MD order.  Peripheral IV removed.  Catheter tip intact.  No distress noted.  Discharge instructions explained.  AVS given to patient and placed in Blue Folder.  Patient verbalized understanding.  VSS.  Afebrile.  No complaints of pain, N/V, diarrhea, or SOB.  Rx provided and placed in Blue Folder.  Patient left with belongings to Main Entrance independently and refused wheelchair transport.  Family with patient.

## 2019-11-27 NOTE — PROGRESS NOTES
[facility] CASE MANAGEMENT                  WRITTEN DISCHARGE INFORMATION      APPOINTMENTS AND RESOURCES TO HELP YOU MANAGE YOUR CARE AT HOME BASED ON YOUR PREFERENCES:  Follow-up Information     Schedule an appointment as soon as possible for a visit with Danika Steven.    Why:  Doctor's office will call you to schedule follow up appointment.   Contact information:  [address] [place] [address] [place] Leone  [id number]  [phone]                   Healthy Living Instructions to HELP MANAGE YOUR CARE AT HOME:  Things You are responsible for:  1.    Getting your prescriptions filled   2.    Taking your medications as directed, DO NOT MISS ANY DOSES!  3.    Following the diet and exercise recommended by your doctor  4.    Going to your follow-up doctor appointment. This is important because it allows the doctor to monitor your progress and determine if any changes need to made to your treatment plan.  5. If you have any questions about MANAGING YOUR CARE AT HOME Call the Nurse Care Line for 24/7 Assistance [phone]       Please answer any calls you may receive from [facility]. We want to continue to support you as you manage your healthcare needs. [facility] is happy to have the opportunity to serve you.      Thank you for choosing [facility] for your healthcare needs!  Your [facility] Case Management Team

## 2019-11-27 NOTE — ED TRIAGE NOTES
Pt to ER with c/o nausea and headache since last night with fever. Pt diagnoses with gull stones yesterday. Pt denies abd at this time,

## 2019-11-27 NOTE — ED NOTES
Pt in the semi-singh's position talking on her cell phone. Pt is A & O x 3, denies pain/distress. Respirations are even and unlabored. Skin is warm, dry and pink. VSS. Will continue to monitor closely.

## 2019-11-27 NOTE — CONSULTS
[facility] Hospital Medicine  Consult Note    Patient Name: Inga Woods  MRN: [id number]  Admission Date: 2019  Hospital Length of Stay: 0 days  Attending Physician: Julien Allan MD   Primary Care Provider: No primary care provider on file.           Patient information was obtained from patient, past medical records and ER records.     Consults  Subjective:     Principal Problem: Cellulitis of right lower extremity    Chief Complaint:   Chief Complaint   Patient presents with    Fever     fever started earlier today. 99.2 in triage. nausea noted. denies SOB, CP, and vomiting.        HPI: 49 year old female with morbid obesity, hypothyroidism, depression, GERD and hypertension who presented with abdominal pain. Concern for acute cholecystitis therefore admitted to general surgery for possible laparoscopic cholecystectomy. Ultrasound RUQ showed cholelithiasis but no evidence of cholecystitis. TMax on admission 99.2F but no leukocytosis nor cholestatic pattern. Urinalysis abnormal but not consistent with urinary tract infection. Patient's abdominal symptoms resolved and able to tolerate regular diet without issues. On exam patient was noted to have erythema in medial aspect to right lower extremity. Patient stated she did not see this till yesterday. Hospital medicine consulted for recommendations regarding management of cellulitis.     Past Medical History:   Diagnosis Date    Anemia     Bronchitis     Depression     GERD (gastroesophageal reflux disease)     Hypertension        Past Surgical History:   Procedure Laterality Date     SECTION      COLONOSCOPY      UPPER GASTROINTESTINAL ENDOSCOPY         Review of patient's allergies indicates:  No Known Allergies    No current facility-administered medications on file prior to encounter.      Current Outpatient Medications on File Prior to Encounter   Medication Sig    citalopram (CELEXA) 40 MG tablet Take 40 mg by  mouth.    hydroCHLOROthiazide (HYDRODIURIL) 25 MG tablet Take 25 mg by mouth.    levothyroxine (SYNTHROID, LEVOTHROID) 175 MCG tablet Take 175 mcg by mouth once daily.    MELATONIN ORAL Take by mouth.    omeprazole (PRILOSEC) 20 MG capsule Take 20 mg by mouth.    sertraline (ZOLOFT) 50 MG tablet Take 50 mg by mouth.    dicyclomine (BENTYL) 20 mg tablet Take 1 tablet (20 mg total) by mouth 2 (two) times daily.    meclizine (ANTIVERT) 12.5 mg tablet Take 1 tablet (12.5 mg total) by mouth 3 (three) times daily as needed for Dizziness.    meloxicam (MOBIC) 7.5 MG tablet Take 1 tablet (7.5 mg total) by mouth once daily.     Family History     None        Tobacco Use    Smoking status: Never Smoker    Smokeless tobacco: Never Used   Substance and Sexual Activity    Alcohol use: No     Frequency: Never    Drug use: No    Sexual activity: Never     Review of Systems   Constitutional: Negative.    HENT: Negative.    Eyes: Negative.    Respiratory: Negative.    Cardiovascular: Negative.    Gastrointestinal: Negative.    Endocrine: Negative.    Genitourinary: Negative.    Musculoskeletal: Negative.    Skin: Positive for color change.   Neurological: Negative.    Hematological: Negative.    Psychiatric/Behavioral: Negative.      Objective:     Vital Signs (Most Recent):  Temp: 98 °F (36.7 °C) (11/27/19 1120)  Pulse: 83 (11/27/19 1120)  Resp: 17 (11/27/19 1120)  BP: 121/67 (11/27/19 1120)  SpO2: 96 % (11/27/19 1120) Vital Signs (24h Range):  Temp:  [97.3 °F (36.3 °C)-101 °F (38.3 °C)] 98 °F (36.7 °C)  Pulse:  [] 83  Resp:  [17-22] 17  SpO2:  [94 %-100 %] 96 %  BP: (116-180)/(58-89) 121/67     Weight: (!) 172.4 kg (380 lb)  Body mass index is 63.24 kg/m².    Physical Exam   Constitutional: She is oriented to person, place, and time. She appears well-developed. No distress.   Well appearing  Morbidly obese   HENT:   Head: Normocephalic and atraumatic.   Eyes: Conjunctivae and EOM are normal.   Neck: Normal range  of motion.   Cardiovascular: Normal rate and regular rhythm.   Pulmonary/Chest: Effort normal and breath sounds normal.   Abdominal: Soft. Bowel sounds are normal.   Musculoskeletal: Normal range of motion.   Neurological: She is alert and oriented to person, place, and time.   Skin: Skin is warm and dry. Capillary refill takes less than 2 seconds. She is not diaphoretic. There is erythema (medial aspect calf with some firmness but bot much tenderness. No fluctuance nor open wounds).   Psychiatric: She has a normal mood and affect. Her behavior is normal. Judgment and thought content normal.   Nursing note and vitals reviewed.      Significant Labs: All pertinent labs within the past 24 hours have been reviewed.    Significant Imaging: I have reviewed all pertinent imaging results/findings within the past 24 hours.  I have reviewed and interpreted all pertinent imaging results/findings within the past 24 hours.    Assessment/Plan:     * Cellulitis of right lower extremity  This is mild cellulitis with some associated leukocytosis. Is otherwise very stable  Would recommend doxycycline 100 mg BID for 7 days starting tomorrow  Will add soft tissue ultrasound to ensure no fluid collection at area that feels firm  If no I&D indicated, patient can be discharged from my standpoint with prescription for doxycycline  Needs to lose weight. F/u with PCP within next 7 days      Gall bladder stones  Per primary  Sounds like she wants symptomatic at some point  Advised low cholesterol diet      Right upper quadrant pain  Resolved         VTE Risk Mitigation (From admission, onward)         Ordered     enoxaparin injection 40 mg  Every 12 hours      11/26/19 2121     Place JEB hose  Until discontinued      11/26/19 2121     IP VTE HIGH RISK PATIENT  Once      11/26/19 2121                    Thank you for your consult. I will follow-up with patient. Please contact us if you have any additional questions.    Aydee Holden  MD  Department of Hospital Medicine   [facility]

## 2019-11-27 NOTE — H&P
[facility]  General Surgery  History & Physical    Patient Name: Inga Woods  MRN: [id number]  Admission Date: 11/26/2019  Attending Physician: Julien Allan MD   Primary Care Provider: Sharmin Yarbrough    Patient information was obtained from patient and ER records.     Subjective:     Chief Complaint/Reason for Admission: Leukocytosis, rule out cholecystitis    History of Present Illness:  50 y/o F with morbid obesity (BMI 63.24), GERD, Hypothyroidism, depression, anemia, prior alcohol abuse, tinea versicolor, chronic lymphedema of lower extremities with superimposed cellulitis (last bout in Dec, 2018) presented to the ED about 2 days ago with some epigastric and RUQ pain. USG showed cholelithiaisis, without cholecystitis. No leukoyctosis at that time. She was discharged home but represented yesterday with fever of 104F. Leukocytosis without elevation of LFTs. Was admitted for observation for possible cholecystitis as no other source was identified in the ED at that time.    Has h/o intermittent crampy abdominal pain for years, mainly in the epigastric region. Associated with intermittent emesis, fatty intolerance, dyspepsia. Has had an EGD which demonstrated gastritis. Also had a Cscope with benign polyp.  The pain 2 days ago was preceded by consumption of pork chops. Did not have a lot of abdominal pain last night.  No diarrhea/icterus/pale stools/dark urine.    Did not note any redness or pain in her legs last night.    Last alcohol intake was in June 2019, she had a 12 pack of beer at that time.  Is on disability.  No chest pain/SOB/tightness    No current facility-administered medications on file prior to encounter.      Current Outpatient Medications on File Prior to Encounter   Medication Sig    citalopram (CELEXA) 40 MG tablet Take 40 mg by mouth.    hydroCHLOROthiazide (HYDRODIURIL) 25 MG tablet Take 25 mg by mouth.    levothyroxine (SYNTHROID, LEVOTHROID) 175 MCG tablet Take  175 mcg by mouth once daily.    MELATONIN ORAL Take by mouth.    omeprazole (PRILOSEC) 20 MG capsule Take 20 mg by mouth.    sertraline (ZOLOFT) 50 MG tablet Take 50 mg by mouth.    dicyclomine (BENTYL) 20 mg tablet Take 1 tablet (20 mg total) by mouth 2 (two) times daily.    meclizine (ANTIVERT) 12.5 mg tablet Take 1 tablet (12.5 mg total) by mouth 3 (three) times daily as needed for Dizziness.    meloxicam (MOBIC) 7.5 MG tablet Take 1 tablet (7.5 mg total) by mouth once daily.       Review of patient's allergies indicates:  No Known Allergies    Past Medical History:   Diagnosis Date    Anemia     Bronchitis     Depression     GERD (gastroesophageal reflux disease)     Hypertension      Past Surgical History:   Procedure Laterality Date     SECTION      COLONOSCOPY      UPPER GASTROINTESTINAL ENDOSCOPY       Family History     None        Tobacco Use    Smoking status: Never Smoker    Smokeless tobacco: Never Used   Substance and Sexual Activity    Alcohol use: No     Frequency: Never    Drug use: No    Sexual activity: Never     Review of Systems   Negative except as noted in HPI  Objective:     Vital Signs (Most Recent):  Temp: 99.6 °F (37.6 °C) (19)  Pulse: 82 (19)  Resp: 20 (19)  BP: 122/74 (19)  SpO2: 97 % (19) Vital Signs (24h Range):  Temp:  [97.3 °F (36.3 °C)-101 °F (38.3 °C)] 99.6 °F (37.6 °C)  Pulse:  [] 82  Resp:  [18-22] 20  SpO2:  [94 %-100 %] 97 %  BP: (116-180)/(58-89) 122/74     Weight: (!) 172.4 kg (380 lb)  Body mass index is 63.24 kg/m².    Physical Exam  General appearance: awake, alert, no acute distress, obese  HEENT: EOMI, anicteric, moist oral mucosal membranes  CV: S1, S2; no murmurs; RRR  Pulm: CTAB, normal WOB  Abd: +bowel sounds, soft, obese, hypopigmented macules on skin - tinea versicolor?, minimal epigastric tenderness, negative Tello's   Extremities: warm, well-perfused, 2+ distal pulses, b/l  edema, over the anteromedial thigh close to the knee is an area with erythema and local raise of temperature, about 23g60ca. Borders were marked.  Right groin non tender lymphadenopathy  Neuro: CN II-XII grossly intact, normal speech  Skin: warm, dry, no rashes  Psych: appropriate mood and affect    Significant Labs:  CBC:   Recent Labs   Lab 11/27/19 0217   WBC 15.81*   RBC 4.32   HGB 11.9*   HCT 38.8      MCV 90   MCH 27.5   MCHC 30.7*     BMP:   Recent Labs   Lab 11/27/19 0217   GLU 96   *   K 4.4      CO2 23   BUN 17   CREATININE 0.9   CALCIUM 8.1*   MG 1.5*     CMP:   Recent Labs   Lab 11/27/19 0217   GLU 96   CALCIUM 8.1*   ALBUMIN 3.0*   PROT 7.2   *   K 4.4   CO2 23      BUN 17   CREATININE 0.9   ALKPHOS 82   ALT 15   AST 19   BILITOT 0.5     LFTs:   Recent Labs   Lab 11/27/19 0217   ALT 15   AST 19   ALKPHOS 82   BILITOT 0.5   PROT 7.2   ALBUMIN 3.0*     Coagulation: No results for input(s): LABPROT, INR, APTT in the last 168 hours.  Cardiac markers: No results for input(s): CKMB, CPKMB, TROPONINT, TROPONINI, MYOGLOBIN in the last 168 hours.  Microbiology Results (last 7 days)     Procedure Component Value Units Date/Time    Blood Culture #1 **CANNOT BE ORDERED STAT** [[id number]] Collected:  11/26/19 1834    Order Status:  Completed Specimen:  Blood from Peripheral, Forearm, Right Updated:  11/27/19 0312     Blood Culture, Routine No Growth to date    Blood Culture #1 **CANNOT BE ORDERED STAT** [[id number]] Collected:  11/26/19 1900    Order Status:  Completed Specimen:  Blood from Peripheral, Forearm, Left Updated:  11/27/19 0312     Blood Culture, Routine No Growth to date        Specimen (12h ago, onward)    None        Recent Labs   Lab 11/26/19 2020   COLORU Yellow   SPECGRAV 1.025   PHUR 5.0   PROTEINUA 1+*   BACTERIA Occasional   NITRITE Negative   LEUKOCYTESUR Trace*   UROBILINOGEN Negative   HYALINECASTS 0       Significant Diagnostics:  11/25/19  US ABDOMEN  LIMITED    CLINICAL HISTORY:  ruq pain;    TECHNIQUE:  Limited ultrasound of the right upper quadrant of the abdomen was performed.    COMPARISON:  December 2018.    FINDINGS:  Hepatic parenchyma is homogeneous without evidence for masses.  No intra- or extrahepatic biliary ductal dilatation. The common bile duct measures 0.3 cm.  Numerous mobile stones are seen within the gallbladder.  No evidence of gallbladder wall thickening or pericholecystic fluid.  Sonographic Tello's sign is negative. Pancreas is obscured by overlying bowel gas.  No evidence of left-sided hydronephrosis.  No ascites.      Impression       Cholelithiasis.  No ultrasonographic evidence to suggest acute cholecystitis.         Assessment/Plan:     Active Diagnoses:    Diagnosis Date Noted POA    Right upper quadrant pain [R10.11] 11/26/2019 Yes    Gall bladder stones [K80.20] 11/26/2019 Yes    Cholecystitis [K81.9] 11/26/2019 Yes      Problems Resolved During this Admission:     VTE Risk Mitigation (From admission, onward)         Ordered     enoxaparin injection 40 mg  Every 12 hours      11/26/19 2121     Place JEB hose  Until discontinued      11/26/19 2121     IP VTE HIGH RISK PATIENT  Once      11/26/19 2121            48 y/o F with morbid obesity (BMI 63.24), GERD, Hypothyroidism, depression, anemia, prior alcohol abuse, tinea versicolor, chronic lymphedema of lower extremities with superimposed cellulitis (last bout in Dec, 2018) presented to the ED about 2 days ago with some epigastric and RUQ pain. USG showed cholelithiaisis, without cholecystitis. No leukoyctosis at that time. She was discharged home but represented yesterday with fever of 104F. Leukocytosis without elevation of LFTs. Was admitted for observation for possible cholecystitis as no other source was identified in the ED at that time.    Was kept NPO overnight on Zosyn. Noted to have cellulitis of the right thigh as noted in the physical exam.  Leukocytosis continues.      Working diagnosis is cellulitis of right thigh and cholelithiasis without cholecystitis.    -No surgical intervention. Will start cardiac diet.  -Added Vanc. Pharmacy consulted for monitoring.  -Hospitalist consulted for management of cellulitis and transfer to their service.  -If clinical picture changes, can consider a HIDA at that time.      Loli Lynne MD  General Surgery  [facility]

## 2019-11-27 NOTE — PLAN OF CARE
"SW met with patient to complete discharge planning assessment. Prior to beginning the discharge planning assessment, patient verified name and date of birth. SW educated patient on the discharge process and explained that discharge planning begins at admission. SW reviewed contents of the "[facility] Packet" emphasizing, "Help at home", "Managing your health", and "Your preferences". Patient stated that she and her  lives with family. Patient stated that she is very independent and walks three blocks to [facility] to  her medication. Patient stated that she also uses [employer] and Medicaid transportation to get to doctor's appointments. Patient stated that her  is disabled but takes good care of her and even cooks meals.  SW wrote name and phone number on white communication board.             11/27/19 5556   Discharge Assessment   Assessment Type Discharge Planning Assessment   Confirmed/corrected address and phone number on facesheet? Yes   Assessment information obtained from? Patient   Communicated expected length of stay with patient/caregiver yes   Prior to hospitilization cognitive status: Alert/Oriented   Prior to hospitalization functional status: Independent   Current cognitive status: Alert/Oriented   Current Functional Status: Independent   Facility Arrived From: Home   Lives With other (see comments)  (Patient and  live with family.)   Able to Return to Prior Arrangements yes   Is patient able to care for self after discharge? Yes   Patient's perception of discharge disposition home or selfcare   Readmission Within the Last 30 Days no previous admission in last 30 days   Patient currently being followed by outpatient case management? No   Patient currently receives any other outside agency services? No   Equipment Currently Used at Home cane, straight   Do you have any problems affording any of your prescribed medications? No   Is the patient taking medications as prescribed? yes "   Does the patient have transportation home? Yes   Transportation Anticipated family or friend will provide   Does the patient receive services at the [facility]? No   Discharge Plan A Home  (PCP)   Discharge Plan B Home  (PCP)   DME Needed Upon Discharge  none   Patient/Family in Agreement with Plan yes     [id number] - [place] - [address]  [address]  [place] [address]  Phone: [phone] Fax: [phone]      Extended Emergency Contact Information  Primary Emergency Contact: Michel Woods  Mobile Phone: [phone]  Relation: Spouse      Danika Steven MD.

## 2019-11-27 NOTE — ASSESSMENT & PLAN NOTE
This is mild cellulitis with some associated leukocytosis. Is otherwise very stable  Would recommend doxycycline 100 mg BID for 7 days starting tomorrow  Will add soft tissue ultrasound to ensure no fluid collection at area that feels firm  If no I&D indicated, patient can be discharged from my standpoint with prescription for doxycycline  Needs to lose weight. F/u with PCP within next 7 days

## 2019-11-27 NOTE — NURSING
Patient arrived to unit via stretcher with transport. Ambulated to bed with standby assist. AAOX4, no c/o pain. IVF initiated as ordered. Patient oriented to room and care setting. Admit navigator in progress. Bed in low locked position, bed alarm armed and audible. Call light in reach.

## 2019-11-27 NOTE — ED NOTES
Pt is in the semi- signh's position, A & O x 3, happy, conversive and laughing. Pt states she feels so much better from when she came in to ER. No respiratory distress observed. VSS. Will continue to monitor closely.

## 2019-11-27 NOTE — ED NOTES
Rec'd report from ROSALIO Wynn RN. Pt in the semi- singh's position, A & O x 3, denies SOB at this time. Pt has no complaint right now. Respirations are even and unlabored. Skin is warm, dry and pink. VSS. Pt is connected to the pulse ox and B/P cuff. Bed is locked and in the low position w/ the side rails up and locked for pt safety. Call bell @ the BS. Will continue to monitor closely.

## 2019-12-01 LAB
BACTERIA BLD CULT: NORMAL
BACTERIA BLD CULT: NORMAL

## 2019-12-03 NOTE — DISCHARGE SUMMARY
DISCHARGE SUMMARY    Patient ID  Inga Woods  49 y.o.  1970    Admission date: 11/26/2019  Discharge date: 11/27/2019    Admitting physician: Julien Allan MD    Discharging physician: same    Admitting diagnoses: Cholecystitis [K81.9]  Right upper quadrant pain [R10.11]  Fever, unspecified fever cause [R50.9]    Discharge diagnoses: same; right thigh cellulitis    Indication for admission: fever    Hospital course:   50 y/o F with morbid obesity (BMI 63.24), GERD, Hypothyroidism, depression, anemia, prior alcohol abuse, tinea versicolor, chronic lymphedema of lower extremities with superimposed cellulitis (last bout in Dec, 2018) presented to the ED about 2 days ago with some epigastric and RUQ pain. USG showed cholelithiaisis, without cholecystitis. No leukoyctosis at that time. She was discharged home but represented yesterday with fever of 104F. Leukocytosis without elevation of LFTs. Was admitted for observation for possible cholecystitis as no other source was identified in the ED at that time.    Was kept NPO overnight on Zosyn. Noted to have cellulitis of the right thigh as noted in the physical exam.  Leukocytosis continues.      Working diagnosis is cellulitis of right thigh and cholelithiasis without cholecystitis.     She is being discharge to home in good condition.    Discharge exam:  General appearance: awake, alert, no acute distress, obese  HEENT: EOMI, anicteric, moist oral mucosal membranes  CV: S1, S2; no murmurs; RRR  Pulm: CTAB, normal WOB  Abd: +bowel sounds, soft, obese, hypopigmented macules on skin - tinea versicolor?, minimal epigastric tenderness, negative Tello's   Extremities: warm, well-perfused, 2+ distal pulses, b/l edema, over the anteromedial thigh close to the knee is an area with erythema and local raise of temperature, about 27u74cl. Borders were marked.  Right groin non tender lymphadenopathy  Neuro: CN II-XII grossly intact, normal speech  Skin: warm, dry, no  rashes  Psych: appropriate mood and affect    Disposition: to home    Patient instructions:    Activity: as tolerated  Diet: regular  Wound care: keep thigh dry    Follow-up with primary in 1-2 weeks    Plan discussed with patient: Yes    Hardy Decker, HO-III  Surgery

## 2021-02-19 ENCOUNTER — HOSPITAL ENCOUNTER (EMERGENCY)
Facility: HOSPITAL | Age: 51
Discharge: HOME | End: 2021-02-19
Attending: EMERGENCY MEDICINE
Payer: MEDICARE

## 2021-02-19 VITALS
OXYGEN SATURATION: 97 % | DIASTOLIC BLOOD PRESSURE: 67 MMHG | RESPIRATION RATE: 18 BRPM | TEMPERATURE: 98 F | SYSTOLIC BLOOD PRESSURE: 125 MMHG | WEIGHT: 293 LBS | HEART RATE: 55 BPM | HEIGHT: 65 IN | BODY MASS INDEX: 48.82 KG/M2

## 2021-02-19 DIAGNOSIS — F41.9 ANXIETY: ICD-10-CM

## 2021-02-19 DIAGNOSIS — R00.2 PALPITATIONS: ICD-10-CM

## 2021-02-19 DIAGNOSIS — R53.81 MALAISE: Primary | ICD-10-CM

## 2021-02-19 LAB
ALBUMIN SERPL BCP-MCNC: 3.6 G/DL (ref 3.5–5.2)
ALP SERPL-CCNC: 82 U/L (ref 55–135)
ALT SERPL W/O P-5'-P-CCNC: 9 U/L (ref 10–44)
ANION GAP SERPL CALC-SCNC: 11 MMOL/L (ref 8–16)
AST SERPL-CCNC: 18 U/L (ref 10–40)
B-HCG UR QL: NEGATIVE
BASOPHILS # BLD AUTO: 0.07 K/UL (ref 0–0.2)
BASOPHILS NFR BLD: 0.8 % (ref 0–1.9)
BILIRUB SERPL-MCNC: 0.2 MG/DL (ref 0.1–1)
BNP SERPL-MCNC: 23 PG/ML (ref 0–99)
BUN SERPL-MCNC: 21 MG/DL (ref 6–20)
CALCIUM SERPL-MCNC: 8.7 MG/DL (ref 8.7–10.5)
CHLORIDE SERPL-SCNC: 103 MMOL/L (ref 95–110)
CO2 SERPL-SCNC: 22 MMOL/L (ref 23–29)
CREAT SERPL-MCNC: 0.8 MG/DL (ref 0.5–1.4)
CTP QC/QA: YES
CTP QC/QA: YES
DIFFERENTIAL METHOD: ABNORMAL
EOSINOPHIL # BLD AUTO: 0.4 K/UL (ref 0–0.5)
EOSINOPHIL NFR BLD: 4.1 % (ref 0–8)
ERYTHROCYTE [DISTWIDTH] IN BLOOD BY AUTOMATED COUNT: 16.2 % (ref 11.5–14.5)
EST. GFR  (AFRICAN AMERICAN): >60 ML/MIN/1.73 M^2
EST. GFR  (NON AFRICAN AMERICAN): >60 ML/MIN/1.73 M^2
GLUCOSE SERPL-MCNC: 97 MG/DL (ref 70–110)
HCT VFR BLD AUTO: 36.7 % (ref 37–48.5)
HGB BLD-MCNC: 11 G/DL (ref 12–16)
IMM GRANULOCYTES # BLD AUTO: 0.05 K/UL (ref 0–0.04)
IMM GRANULOCYTES NFR BLD AUTO: 0.6 % (ref 0–0.5)
LYMPHOCYTES # BLD AUTO: 2.1 K/UL (ref 1–4.8)
LYMPHOCYTES NFR BLD: 22.9 % (ref 18–48)
MCH RBC QN AUTO: 23.7 PG (ref 27–31)
MCHC RBC AUTO-ENTMCNC: 30 G/DL (ref 32–36)
MCV RBC AUTO: 79 FL (ref 82–98)
MONOCYTES # BLD AUTO: 0.7 K/UL (ref 0.3–1)
MONOCYTES NFR BLD: 8.2 % (ref 4–15)
NEUTROPHILS # BLD AUTO: 5.8 K/UL (ref 1.8–7.7)
NEUTROPHILS NFR BLD: 63.4 % (ref 38–73)
NRBC BLD-RTO: 0 /100 WBC
PLATELET # BLD AUTO: 263 K/UL (ref 150–350)
PMV BLD AUTO: 11 FL (ref 9.2–12.9)
POCT GLUCOSE: 109 MG/DL (ref 70–110)
POTASSIUM SERPL-SCNC: 4.4 MMOL/L (ref 3.5–5.1)
PROT SERPL-MCNC: 7.7 G/DL (ref 6–8.4)
RBC # BLD AUTO: 4.64 M/UL (ref 4–5.4)
SARS-COV-2 RDRP RESP QL NAA+PROBE: NEGATIVE
SODIUM SERPL-SCNC: 136 MMOL/L (ref 136–145)
T4 FREE SERPL-MCNC: 1.05 NG/DL (ref 0.71–1.51)
TROPONIN I SERPL DL<=0.01 NG/ML-MCNC: <0.006 NG/ML (ref 0–0.03)
TSH SERPL DL<=0.005 MIU/L-ACNC: 6.15 UIU/ML (ref 0.4–4)
WBC # BLD AUTO: 9.05 K/UL (ref 3.9–12.7)

## 2021-02-19 PROCEDURE — 99284 EMERGENCY DEPT VISIT MOD MDM: CPT | Mod: 25

## 2021-02-19 PROCEDURE — 93010 ELECTROCARDIOGRAM REPORT: CPT | Mod: ,,, | Performed by: INTERNAL MEDICINE

## 2021-02-19 PROCEDURE — 84443 ASSAY THYROID STIM HORMONE: CPT

## 2021-02-19 PROCEDURE — 93010 EKG 12-LEAD: ICD-10-PCS | Mod: ,,, | Performed by: INTERNAL MEDICINE

## 2021-02-19 PROCEDURE — 83880 ASSAY OF NATRIURETIC PEPTIDE: CPT

## 2021-02-19 PROCEDURE — 93005 ELECTROCARDIOGRAM TRACING: CPT

## 2021-02-19 PROCEDURE — 85025 COMPLETE CBC W/AUTO DIFF WBC: CPT

## 2021-02-19 PROCEDURE — 84484 ASSAY OF TROPONIN QUANT: CPT

## 2021-02-19 PROCEDURE — 84439 ASSAY OF FREE THYROXINE: CPT

## 2021-02-19 PROCEDURE — 80053 COMPREHEN METABOLIC PANEL: CPT

## 2021-02-19 PROCEDURE — 81025 URINE PREGNANCY TEST: CPT | Performed by: EMERGENCY MEDICINE

## 2021-02-19 PROCEDURE — 82962 GLUCOSE BLOOD TEST: CPT

## 2021-02-19 PROCEDURE — U0002 COVID-19 LAB TEST NON-CDC: HCPCS | Performed by: EMERGENCY MEDICINE

## 2021-02-19 RX ORDER — LORAZEPAM 0.5 MG/1
1 TABLET ORAL
Status: DISCONTINUED | OUTPATIENT
Start: 2021-02-19 | End: 2021-02-19 | Stop reason: HOSPADM

## 2021-02-19 RX ORDER — LORAZEPAM 1 MG/1
1 TABLET ORAL EVERY 6 HOURS PRN
Qty: 10 TABLET | Refills: 0 | Status: SHIPPED | OUTPATIENT
Start: 2021-02-19 | End: 2021-02-22

## 2022-07-06 PROCEDURE — 96365 THER/PROPH/DIAG IV INF INIT: CPT

## 2022-07-06 PROCEDURE — 96375 TX/PRO/DX INJ NEW DRUG ADDON: CPT

## 2022-07-06 PROCEDURE — 80047 BASIC METABLC PNL IONIZED CA: CPT

## 2022-07-06 PROCEDURE — 99284 EMERGENCY DEPT VISIT MOD MDM: CPT | Mod: 25

## 2022-07-07 ENCOUNTER — HOSPITAL ENCOUNTER (EMERGENCY)
Facility: HOSPITAL | Age: 52
Discharge: HOME | End: 2022-07-07
Attending: STUDENT IN AN ORGANIZED HEALTH CARE EDUCATION/TRAINING PROGRAM
Payer: COMMERCIAL

## 2022-07-07 VITALS
HEIGHT: 63 IN | BODY MASS INDEX: 51.91 KG/M2 | TEMPERATURE: 98 F | DIASTOLIC BLOOD PRESSURE: 62 MMHG | WEIGHT: 293 LBS | OXYGEN SATURATION: 95 % | HEART RATE: 75 BPM | RESPIRATION RATE: 21 BRPM | SYSTOLIC BLOOD PRESSURE: 114 MMHG

## 2022-07-07 DIAGNOSIS — R11.2 NON-INTRACTABLE VOMITING WITH NAUSEA, UNSPECIFIED VOMITING TYPE: Primary | ICD-10-CM

## 2022-07-07 DIAGNOSIS — K52.9 GASTROENTERITIS: ICD-10-CM

## 2022-07-07 DIAGNOSIS — L03.115 CELLULITIS OF RIGHT LOWER EXTREMITY: ICD-10-CM

## 2022-07-07 LAB
ALBUMIN SERPL BCP-MCNC: 4 G/DL (ref 3.5–5.2)
ALP SERPL-CCNC: 69 U/L (ref 55–135)
ALT SERPL W/O P-5'-P-CCNC: 12 U/L (ref 10–44)
ANION GAP SERPL CALC-SCNC: 12 MMOL/L (ref 8–16)
ANION GAP SERPL CALC-SCNC: 17 MMOL/L (ref 8–16)
AST SERPL-CCNC: 15 U/L (ref 10–40)
BASOPHILS # BLD AUTO: 0.04 K/UL (ref 0–0.2)
BASOPHILS NFR BLD: 0.3 % (ref 0–1.9)
BILIRUB SERPL-MCNC: 0.6 MG/DL (ref 0.1–1)
BILIRUB UR QL STRIP: NEGATIVE
BILIRUB UR QL STRIP: NEGATIVE
BUN SERPL-MCNC: 16 MG/DL (ref 6–20)
BUN SERPL-MCNC: 16 MG/DL (ref 6–30)
CALCIUM SERPL-MCNC: 9.3 MG/DL (ref 8.7–10.5)
CHLORIDE SERPL-SCNC: 102 MMOL/L (ref 95–110)
CHLORIDE SERPL-SCNC: 104 MMOL/L (ref 95–110)
CLARITY UR: CLEAR
CLARITY UR: CLEAR
CO2 SERPL-SCNC: 22 MMOL/L (ref 23–29)
COLOR UR: YELLOW
COLOR UR: YELLOW
CREAT SERPL-MCNC: 0.9 MG/DL (ref 0.5–1.4)
CREAT SERPL-MCNC: 1 MG/DL (ref 0.5–1.4)
CRP SERPL-MCNC: 33.3 MG/L (ref 0–8.2)
DIFFERENTIAL METHOD: ABNORMAL
EOSINOPHIL # BLD AUTO: 0 K/UL (ref 0–0.5)
EOSINOPHIL NFR BLD: 0.1 % (ref 0–8)
ERYTHROCYTE [DISTWIDTH] IN BLOOD BY AUTOMATED COUNT: 14.8 % (ref 11.5–14.5)
ERYTHROCYTE [SEDIMENTATION RATE] IN BLOOD BY WESTERGREN METHOD: 25 MM/HR (ref 0–20)
EST. GFR  (AFRICAN AMERICAN): >60 ML/MIN/1.73 M^2
EST. GFR  (NON AFRICAN AMERICAN): >60 ML/MIN/1.73 M^2
GLUCOSE SERPL-MCNC: 109 MG/DL (ref 70–110)
GLUCOSE SERPL-MCNC: 98 MG/DL (ref 70–110)
GLUCOSE UR QL STRIP: NEGATIVE
GLUCOSE UR QL STRIP: NEGATIVE
HCT VFR BLD AUTO: 46.8 % (ref 37–48.5)
HCT VFR BLD CALC: 46 %PCV (ref 36–54)
HGB BLD-MCNC: 14.9 G/DL (ref 12–16)
HGB UR QL STRIP: NEGATIVE
HGB UR QL STRIP: NEGATIVE
IMM GRANULOCYTES # BLD AUTO: 0.18 K/UL (ref 0–0.04)
IMM GRANULOCYTES NFR BLD AUTO: 1.1 % (ref 0–0.5)
KETONES UR QL STRIP: NEGATIVE
KETONES UR QL STRIP: NEGATIVE
LACTATE SERPL-SCNC: 1.4 MMOL/L (ref 0.5–2.2)
LACTATE SERPL-SCNC: 2.8 MMOL/L (ref 0.5–2.2)
LEUKOCYTE ESTERASE UR QL STRIP: NEGATIVE
LEUKOCYTE ESTERASE UR QL STRIP: NEGATIVE
LIPASE SERPL-CCNC: 20 U/L (ref 4–60)
LYMPHOCYTES # BLD AUTO: 0.3 K/UL (ref 1–4.8)
LYMPHOCYTES NFR BLD: 1.7 % (ref 18–48)
MCH RBC QN AUTO: 27.1 PG (ref 27–31)
MCHC RBC AUTO-ENTMCNC: 31.8 G/DL (ref 32–36)
MCV RBC AUTO: 85 FL (ref 82–98)
MONOCYTES # BLD AUTO: 0.9 K/UL (ref 0.3–1)
MONOCYTES NFR BLD: 5.8 % (ref 4–15)
NEUTROPHILS # BLD AUTO: 14.5 K/UL (ref 1.8–7.7)
NEUTROPHILS NFR BLD: 91 % (ref 38–73)
NITRITE UR QL STRIP: NEGATIVE
NITRITE UR QL STRIP: NEGATIVE
NRBC BLD-RTO: 0 /100 WBC
PH UR STRIP: 6 [PH] (ref 5–8)
PH UR STRIP: 6 [PH] (ref 5–8)
PLATELET # BLD AUTO: 163 K/UL (ref 150–450)
PMV BLD AUTO: 11.2 FL (ref 9.2–12.9)
POC IONIZED CALCIUM: 1.15 MMOL/L (ref 1.06–1.42)
POC TCO2 (MEASURED): 26 MMOL/L (ref 23–29)
POTASSIUM BLD-SCNC: 3.9 MMOL/L (ref 3.5–5.1)
POTASSIUM SERPL-SCNC: 4 MMOL/L (ref 3.5–5.1)
PROT SERPL-MCNC: 8.5 G/DL (ref 6–8.4)
PROT UR QL STRIP: ABNORMAL
PROT UR QL STRIP: ABNORMAL
RBC # BLD AUTO: 5.5 M/UL (ref 4–5.4)
SAMPLE: ABNORMAL
SODIUM BLD-SCNC: 140 MMOL/L (ref 136–145)
SODIUM SERPL-SCNC: 138 MMOL/L (ref 136–145)
SP GR UR STRIP: 1.02 (ref 1–1.03)
SP GR UR STRIP: 1.02 (ref 1–1.03)
URN SPEC COLLECT METH UR: ABNORMAL
URN SPEC COLLECT METH UR: ABNORMAL
UROBILINOGEN UR STRIP-ACNC: NEGATIVE EU/DL
UROBILINOGEN UR STRIP-ACNC: NEGATIVE EU/DL
WBC # BLD AUTO: 15.92 K/UL (ref 3.9–12.7)

## 2022-07-07 PROCEDURE — 80053 COMPREHEN METABOLIC PANEL: CPT | Performed by: STUDENT IN AN ORGANIZED HEALTH CARE EDUCATION/TRAINING PROGRAM

## 2022-07-07 PROCEDURE — 83605 ASSAY OF LACTIC ACID: CPT | Performed by: STUDENT IN AN ORGANIZED HEALTH CARE EDUCATION/TRAINING PROGRAM

## 2022-07-07 PROCEDURE — 87040 BLOOD CULTURE FOR BACTERIA: CPT | Performed by: STUDENT IN AN ORGANIZED HEALTH CARE EDUCATION/TRAINING PROGRAM

## 2022-07-07 PROCEDURE — 25500020 PHARM REV CODE 255: Performed by: STUDENT IN AN ORGANIZED HEALTH CARE EDUCATION/TRAINING PROGRAM

## 2022-07-07 PROCEDURE — 25000003 PHARM REV CODE 250: Performed by: STUDENT IN AN ORGANIZED HEALTH CARE EDUCATION/TRAINING PROGRAM

## 2022-07-07 PROCEDURE — 85025 COMPLETE CBC W/AUTO DIFF WBC: CPT | Performed by: STUDENT IN AN ORGANIZED HEALTH CARE EDUCATION/TRAINING PROGRAM

## 2022-07-07 PROCEDURE — 83690 ASSAY OF LIPASE: CPT | Performed by: STUDENT IN AN ORGANIZED HEALTH CARE EDUCATION/TRAINING PROGRAM

## 2022-07-07 PROCEDURE — 81003 URINALYSIS AUTO W/O SCOPE: CPT | Performed by: STUDENT IN AN ORGANIZED HEALTH CARE EDUCATION/TRAINING PROGRAM

## 2022-07-07 PROCEDURE — 86140 C-REACTIVE PROTEIN: CPT | Performed by: STUDENT IN AN ORGANIZED HEALTH CARE EDUCATION/TRAINING PROGRAM

## 2022-07-07 PROCEDURE — 63600175 PHARM REV CODE 636 W HCPCS: Performed by: STUDENT IN AN ORGANIZED HEALTH CARE EDUCATION/TRAINING PROGRAM

## 2022-07-07 PROCEDURE — 85652 RBC SED RATE AUTOMATED: CPT | Performed by: STUDENT IN AN ORGANIZED HEALTH CARE EDUCATION/TRAINING PROGRAM

## 2022-07-07 RX ORDER — HYOSCYAMINE SULFATE 0.12 MG/1
0.12 TABLET SUBLINGUAL EVERY 4 HOURS PRN
Qty: 28 TABLET | Refills: 0 | Status: SHIPPED | OUTPATIENT
Start: 2022-07-07 | End: 2022-07-14

## 2022-07-07 RX ORDER — CEFAZOLIN SODIUM 1 G/50ML
2 SOLUTION INTRAVENOUS
Status: DISCONTINUED | OUTPATIENT
Start: 2022-07-07 | End: 2022-07-07 | Stop reason: HOSPADM

## 2022-07-07 RX ORDER — MAG HYDROX/ALUMINUM HYD/SIMETH 200-200-20
30 SUSPENSION, ORAL (FINAL DOSE FORM) ORAL ONCE
Status: COMPLETED | OUTPATIENT
Start: 2022-07-07 | End: 2022-07-07

## 2022-07-07 RX ORDER — CEFAZOLIN SODIUM 2 G/50ML
2 SOLUTION INTRAVENOUS
Status: DISCONTINUED | OUTPATIENT
Start: 2022-07-07 | End: 2022-07-07 | Stop reason: SDUPTHER

## 2022-07-07 RX ORDER — ONDANSETRON 4 MG/1
4 TABLET, FILM COATED ORAL EVERY 6 HOURS
Qty: 12 TABLET | Refills: 0 | Status: SHIPPED | OUTPATIENT
Start: 2022-07-07

## 2022-07-07 RX ORDER — SUCRALFATE 1 G/1
1 TABLET ORAL 4 TIMES DAILY
Qty: 14 TABLET | Refills: 1 | Status: SHIPPED | OUTPATIENT
Start: 2022-07-07

## 2022-07-07 RX ORDER — ONDANSETRON 2 MG/ML
4 INJECTION INTRAMUSCULAR; INTRAVENOUS
Status: COMPLETED | OUTPATIENT
Start: 2022-07-07 | End: 2022-07-07

## 2022-07-07 RX ORDER — LIDOCAINE HYDROCHLORIDE 20 MG/ML
10 SOLUTION OROPHARYNGEAL ONCE
Status: COMPLETED | OUTPATIENT
Start: 2022-07-07 | End: 2022-07-07

## 2022-07-07 RX ORDER — ACETAMINOPHEN 500 MG
1000 TABLET ORAL
Status: COMPLETED | OUTPATIENT
Start: 2022-07-07 | End: 2022-07-07

## 2022-07-07 RX ORDER — CEFUROXIME AXETIL 500 MG/1
500 TABLET ORAL EVERY 12 HOURS
Qty: 14 TABLET | Refills: 0 | Status: SHIPPED | OUTPATIENT
Start: 2022-07-07 | End: 2022-07-14

## 2022-07-07 RX ADMIN — ONDANSETRON 4 MG: 2 INJECTION INTRAMUSCULAR; INTRAVENOUS at 03:07

## 2022-07-07 RX ADMIN — SODIUM CHLORIDE 1000 ML: 0.9 INJECTION, SOLUTION INTRAVENOUS at 03:07

## 2022-07-07 RX ADMIN — ALUMINUM HYDROXIDE, MAGNESIUM HYDROXIDE, AND SIMETHICONE 30 ML: 200; 200; 20 SUSPENSION ORAL at 03:07

## 2022-07-07 RX ADMIN — IOHEXOL 100 ML: 350 INJECTION, SOLUTION INTRAVENOUS at 04:07

## 2022-07-07 RX ADMIN — ACETAMINOPHEN 1000 MG: 500 TABLET ORAL at 04:07

## 2022-07-07 RX ADMIN — LIDOCAINE HYDROCHLORIDE 10 ML: 20 SOLUTION ORAL; TOPICAL at 03:07

## 2022-07-07 RX ADMIN — CEFAZOLIN SODIUM 2 G: 1 SOLUTION INTRAVENOUS at 03:07

## 2022-07-07 NOTE — ED PROVIDER NOTES
Encounter Date: 2022    SCRIBE #1 NOTE: I, Danitza Vergara, am scribing for, and in the presence of,  Kuldeep Dwyer MD. I have scribed the following portions of the note - Other sections scribed: HPI, ROS, PE.       History     Chief Complaint   Patient presents with    Abdominal Pain    Headache    Vomiting     Pt reports N/V with upper abdominal pain. Reports she was dx with gallstones some time ago. She also c/o headache and fever. Pt is afebrile in triage. No OTC medication taken pta.      Inga Woosd is a 52 y.o. female, with a PMHx of morbid obesity, GERD, anemia, hypothyroidism, HTN, hiatal hernia, Cholelithiasis, who presents to the ED with abdominal pain. Patient reports persistent, severe left upper quadrant abdominal pain, along with associated nausea and non-bloody, bilious vomiting. Patient also reports a waxing and waning generalized headache. She further notes suffering from a redness consistent with cellulitis to her right upper extremity for a while; states she has not been treated. Currently reports pain 4/10 in severity. No other exacerbating or alleviating factors. Denies chest pain, shorntess of breath, diarrhea, dysuria, hematuria, or other associated symptoms. No recent falls. PSHx of .     The history is provided by the patient.     Review of patient's allergies indicates:  No Known Allergies  Past Medical History:   Diagnosis Date    Anemia     Bronchitis     Depression     GERD (gastroesophageal reflux disease)     Hypertension      Past Surgical History:   Procedure Laterality Date     SECTION      COLONOSCOPY      UPPER GASTROINTESTINAL ENDOSCOPY       No family history on file.  Social History     Tobacco Use    Smoking status: Never Smoker    Smokeless tobacco: Never Used   Substance Use Topics    Alcohol use: No    Drug use: No     Review of Systems   Constitutional: Negative for chills and fever.   HENT: Negative for facial swelling and sore  throat.    Eyes: Negative for visual disturbance.   Respiratory: Negative for cough and shortness of breath.    Cardiovascular: Negative for chest pain and palpitations.   Gastrointestinal: Positive for abdominal pain, nausea and vomiting. Negative for diarrhea.   Genitourinary: Negative for dysuria and hematuria.   Musculoskeletal: Negative for back pain.   Skin: Positive for color change.        +cellulitis, right lower extremity.   Neurological: Positive for headaches. Negative for weakness.   Hematological: Does not bruise/bleed easily.   Psychiatric/Behavioral: Negative.        Physical Exam     Initial Vitals [07/06/22 2303]   BP Pulse Resp Temp SpO2   135/89 (!) 115 20 98.8 °F (37.1 °C) 96 %      MAP       --         Physical Exam    Nursing note and vitals reviewed.  Constitutional: She appears well-developed and well-nourished. She is not diaphoretic. No distress.   HENT:   Head: Normocephalic and atraumatic.   Right Ear: External ear normal.   Left Ear: External ear normal.   Nose: Nose normal.   Eyes: Conjunctivae are normal. No scleral icterus.   Neck: Neck supple. No tracheal deviation present.   Normal range of motion.  Cardiovascular: Normal rate, regular rhythm and normal heart sounds.   Pulses:       Radial pulses are 2+ on the right side and 2+ on the left side.        Dorsalis pedis pulses are 2+ on the right side and 2+ on the left side.   Pulmonary/Chest: Breath sounds normal. No respiratory distress.   Abdominal: Abdomen is soft. Bowel sounds are normal. There is abdominal tenderness.   Left upper quadrant is mildly tender. Abdomen soft, no other tenderness elsewhere.    Musculoskeletal:      Cervical back: Normal range of motion and neck supple.     Neurological: She is alert and oriented to person, place, and time. No sensory deficit.   Good distal sensation.   Skin: Skin is warm and dry.   Large area at the right lower extremity, with largest diameter 15 cm, that is warm to touch, painful,  erythematous, indurated, without fluctuance, well demarcated.    Psychiatric: She has a normal mood and affect. Thought content normal.         ED Course   Procedures  Labs Reviewed   CBC W/ AUTO DIFFERENTIAL - Abnormal; Notable for the following components:       Result Value    WBC 15.92 (*)     RBC 5.50 (*)     MCHC 31.8 (*)     RDW 14.8 (*)     Immature Granulocytes 1.1 (*)     Gran # (ANC) 14.5 (*)     Immature Grans (Abs) 0.18 (*)     Lymph # 0.3 (*)     Gran % 91.0 (*)     Lymph % 1.7 (*)     All other components within normal limits   COMPREHENSIVE METABOLIC PANEL - Abnormal; Notable for the following components:    CO2 22 (*)     Total Protein 8.5 (*)     All other components within normal limits   URINALYSIS, REFLEX TO URINE CULTURE - Abnormal; Notable for the following components:    Protein, UA Trace (*)     All other components within normal limits    Narrative:     Specimen Source->Urine   LACTIC ACID, PLASMA - Abnormal; Notable for the following components:    Lactate (Lactic Acid) 2.8 (*)     All other components within normal limits   C-REACTIVE PROTEIN - Abnormal; Notable for the following components:    CRP 33.3 (*)     All other components within normal limits   SEDIMENTATION RATE - Abnormal; Notable for the following components:    Sed Rate 25 (*)     All other components within normal limits   URINALYSIS, REFLEX TO URINE CULTURE - Abnormal; Notable for the following components:    Protein, UA Trace (*)     All other components within normal limits    Narrative:     Specimen Source->Urine   ISTAT PROCEDURE - Abnormal; Notable for the following components:    POC Anion Gap 17 (*)     All other components within normal limits   CULTURE, BLOOD   CULTURE, BLOOD   LIPASE   LACTIC ACID, PLASMA          Imaging Results          CT Abdomen Pelvis With Contrast (Final result)  Result time 07/07/22 06:19:42    Final result by Janessa Foley MD (07/07/22 06:19:42)                 Impression:      1.  Cholelithiasis.  2. Marked hepatosplenomegaly.  3. Small hiatal hernia.  4. Additional findings as discussed above.      Electronically signed by: Janessa Foley MD  Date:    07/07/2022  Time:    06:19             Narrative:    EXAMINATION:  CT ABDOMEN PELVIS WITH CONTRAST    CLINICAL HISTORY:  Epigastric pain;    TECHNIQUE:  Low dose axial images, sagittal and coronal reformations were obtained from the lung bases to the pubic symphysis following the IV administration of 100 mL of Omnipaque 350 .  No oral contrast.    COMPARISON:  Ultrasound 11/25/2019    FINDINGS:  Please note image quality is degraded due to artifact relating to patient body habitus.  The visualized lung bases are free of pleural fluid or focal consolidation.  The visualized portions of the heart and pericardium are within normal limits.    The liver is enlarged.  No definite focal hepatic abnormality identified.  Noncalcified stones are identified in the gallbladder lumen.  The portal vein and splenic vein are patent.  The spleen is enlarged measuring 19 cm in craniocaudad dimension.  The pancreas appears to enhance appropriately without significant peripancreatic inflammatory change or fat stranding allowing for aforementioned artifact.  The adrenal glands are unremarkable.    The kidneys appear to enhance symmetrically without evidence of hydronephrosis.  The urinary bladder is suboptimally distended limiting evaluation.  The uterus and adnexal region appear to be within normal limits.    The abdominal aorta is nonaneurysmal.  There is a mildly enlarged caval lymph node measuring 1.0 cm in short axis diameter.    The visualized loops of large and small bowel demonstrate no definite evidence of obstruction or inflammatory change.  There is a small hiatal hernia present.  No definite right lower quadrant inflammatory change to suggest acute appendicitis noting the appendix is not definitively visualized.  There is no ascites, free intraperitoneal  air or portal venous gas.    The visualized osseous structures are intact                                 Medications   ondansetron injection 4 mg (4 mg Intravenous Given 7/7/22 0324)   aluminum-magnesium hydroxide-simethicone 200-200-20 mg/5 mL suspension 30 mL (30 mLs Oral Given 7/7/22 0324)     And   LIDOcaine HCl 2% oral solution 10 mL (10 mLs Oral Given 7/7/22 0324)   sodium chloride 0.9% bolus 1,000 mL (0 mLs Intravenous Stopped 7/7/22 0445)   iohexoL (OMNIPAQUE 350) injection 100 mL (100 mLs Intravenous Given 7/7/22 0425)   acetaminophen tablet 1,000 mg (1,000 mg Oral Given 7/7/22 0445)     Medical Decision Making:   History:   Old Medical Records: I decided to obtain old medical records.  Clinical Tests:   Lab Tests: Ordered and Reviewed          Scribe Attestation:   Scribe #1: I performed the above scribed service and the documentation accurately describes the services I performed. I attest to the accuracy of the note.        ED Course as of 07/07/22 2157   Thu Jul 07, 2022   0704 Lactic improved significantly with fluid resuscitation.  Patient with cellulitis and evidence on laboratory workup.  Vitals have been stable.  Patient no longer tachycardic.  Her heart rate and prior to fluid resuscitation.  She appears hemoconcentrated on CBC.  UA is a negative of the TI.  Electrolytes within normal limits.  Patient does not meet inpatient criteria.  I will start patient on antibiotics and have her follow-up with her primary care provider and 2-3 days for re-evaluation of the area.  I have marked off the erythematous area.  Patient denies any abdominal pain, nausea more evaluation.  Will start patient on Carafate with concern for gastritis as well as Protonix.  Again patient looks well on re-evaluation.  CT of the abdomen and pelvis shows no acute findings other than cholelithiasis.  Patient did not have right upper quadrant tenderness on exam.  No evidence of pancreatitis on CT and lipase normal.  Plan for  discharge. [CC]      ED Course User Index  [CC] Kuldeep Dwyer MD             Clinical Impression:   Final diagnoses:  [R11.2] Non-intractable vomiting with nausea, unspecified vomiting type (Primary)  [L03.115] Cellulitis of right lower extremity  [K52.9] Gastroenteritis          ED Disposition Condition    Discharge Stable       I, Kuldeep Dwyer MD, personally performed the services described in this documentation. All medical record entries made by the scribe were at my direction and in my presence. I have reviewed the chart and agree that the record reflects my personal performance and is accurate and complete.    ED Prescriptions     Medication Sig Dispense Start Date End Date Auth. Provider    ondansetron (ZOFRAN) 4 MG tablet Take 1 tablet (4 mg total) by mouth every 6 (six) hours. 12 tablet 7/7/2022  Kuldeep Dwyer MD    sucralfate (CARAFATE) 1 gram tablet Take 1 tablet (1 g total) by mouth 4 (four) times daily. 14 tablet 7/7/2022  Kuldeep Dwyer MD    cefUROXime (CEFTIN) 500 MG tablet Take 1 tablet (500 mg total) by mouth every 12 (twelve) hours. for 7 days 14 tablet 7/7/2022 7/14/2022 Kuldeep Dwyer MD    hyoscyamine (LEVSIN/SL) 0.125 mg Subl Place 1 tablet (0.125 mg total) under the tongue every 4 (four) hours as needed (abdominal cramping). 28 tablet 7/7/2022 7/14/2022 Kuldeep Dwyer MD        Follow-up Information     Follow up With Specialties Details Why Contact Info    [facility] Emergency Dept Emergency Medicine Go in 2 days  [address]  [facility] [address] [phone]    Danika Steven MD Family Medicine Schedule an appointment as soon as possible for a visit   [address]  [address]  [facility]  [facility] [address] [phone]             Kuldeep Dwyer MD  07/07/22 9027

## 2022-07-07 NOTE — DISCHARGE INSTRUCTIONS
You need to follow-up with your doctor in 2-3 days for re-evaluation.  Please return with any worsening fever, worsening pain, nausea, vomiting, inability to tolerate the medication due to nausea and vomiting.

## 2022-07-07 NOTE — ED TRIAGE NOTES
Pt presents to ED c/o LUQ abd pain, reports having fever earlier today, vomiting (yellow appearance), and leg pain 5/10 that all started earlier today. Pt reports hx of hiatal hernia and gallstones. Pt denies SOB, chest pain, numbness, weakness. Pt is alert, calm, and oriented x4, placed on cardiac monitoring and continuous pulse ox, 96% on RA.

## 2022-07-11 LAB
BACTERIA BLD CULT: NORMAL
BACTERIA BLD CULT: NORMAL

## 2022-07-20 ENCOUNTER — HOSPITAL ENCOUNTER (EMERGENCY)
Facility: HOSPITAL | Age: 52
Discharge: HOME | End: 2022-07-21
Attending: STUDENT IN AN ORGANIZED HEALTH CARE EDUCATION/TRAINING PROGRAM
Payer: COMMERCIAL

## 2022-07-20 DIAGNOSIS — S80.10XA CONTUSION OF MULTIPLE SITES OF LOWER EXTREMITY, UNSPECIFIED LATERALITY, INITIAL ENCOUNTER: ICD-10-CM
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
## 2022-07-20 DIAGNOSIS — R07.89 CHEST DISCOMFORT: ICD-10-CM

## 2022-07-20 DIAGNOSIS — S20.212A CONTUSION OF LEFT CHEST WALL, INITIAL ENCOUNTER: ICD-10-CM

## 2022-07-20 PROCEDURE — 99284 EMERGENCY DEPT VISIT MOD MDM: CPT | Mod: 25

## 2022-07-20 PROCEDURE — 93010 ELECTROCARDIOGRAM REPORT: CPT | Mod: ,,, | Performed by: INTERNAL MEDICINE

## 2022-07-20 PROCEDURE — 25000003 PHARM REV CODE 250: Performed by: PHYSICIAN ASSISTANT

## 2022-07-20 PROCEDURE — 93005 ELECTROCARDIOGRAM TRACING: CPT

## 2022-07-20 PROCEDURE — 93010 EKG 12-LEAD: ICD-10-PCS | Mod: ,,, | Performed by: INTERNAL MEDICINE

## 2022-07-20 RX ORDER — HYDROCODONE BITARTRATE AND ACETAMINOPHEN 5; 325 MG/1; MG/1
1 TABLET ORAL
Status: COMPLETED | OUTPATIENT
Start: 2022-07-20 | End: 2022-07-20

## 2022-07-20 RX ADMIN — HYDROCODONE BITARTRATE AND ACETAMINOPHEN 1 TABLET: 5; 325 TABLET ORAL at 11:07

## 2022-07-21 VITALS
BODY MASS INDEX: 51.91 KG/M2 | DIASTOLIC BLOOD PRESSURE: 77 MMHG | RESPIRATION RATE: 18 BRPM | SYSTOLIC BLOOD PRESSURE: 134 MMHG | WEIGHT: 293 LBS | OXYGEN SATURATION: 99 % | HEIGHT: 63 IN | HEART RATE: 66 BPM | TEMPERATURE: 99 F

## 2022-07-21 PROCEDURE — 99900035 HC TECH TIME PER 15 MIN (STAT)

## 2022-07-21 PROCEDURE — 94799 UNLISTED PULMONARY SVC/PX: CPT

## 2022-07-21 RX ORDER — HYDROCODONE BITARTRATE AND ACETAMINOPHEN 5; 325 MG/1; MG/1
1 TABLET ORAL EVERY 6 HOURS PRN
Qty: 10 TABLET | Refills: 0 | Status: SHIPPED | OUTPATIENT
Start: 2022-07-21

## 2022-07-21 NOTE — ED PROVIDER NOTES
Encounter Date: 7/20/2022       History     Chief Complaint   Patient presents with    Fall     Trip and fall on Friday causing pain to her left rib region. Pt denies any pain anywhere else. BP slightly elevated, pt did not take her BP medication today. Denies any visible bruising.      51yo F with chief complaint L inframammary chest discomfort 2/2 fall on Friday.    Mechanical fall on Friday; tripped on a piece of her 's electric scooter, fell inside onto wooden floor. L arm was oriented across chest. Fell forward onto both anterior knees and onto abdomen/chest. No head injury or LOC. Denies any pain to L shoulder, elbow, wrist, hand. No neck or back pain. States bruising to legs, mildly ttp. No new antalgic gait. No obvious joint swelling. No pain to legs with weight-bearing or ambulation. Denies hip or thigh pain. States severe, persistent pain to L inframammary chest. No open wound. No bruising.  Pain is sharp, worse with range of motion of her left upper extremity, with deep inspiration.  She admits to feeling a bit short of breath since the fall, states she is unable to take a deep breath.  No chest pain.  No nausea vomiting, no syncope or near-syncope, no palpitations, no diaphoresis.  No fever.  No new cough.  No hemoptysis.  Denies change in appetite or intake.  No abdominal pain.  No nausea vomiting.  Two normal BMs today.  No melena or hematochezia.  Denies calf pain.  No new leg swelling (chronic lymphedema).       PMH:  Lymphedema;   Recurrent major depressive disorder, in partial remission (CMS/HCC);   Class 3 severe obesity with body mass index (BMI) of 60.0 to 69.9 in adult, unspecified obesity type, unspecified whether serious comorbidity present (CMS/HCC);   Essential hypertension;   Acquired hypothyroidism;   Vitamin D deficiency;   Iron deficiency anemia due to chronic blood loss;   B12 deficiency;   IGT (impaired glucose tolerance);   Muscle cramps         Review of patient's allergies  indicates:  No Known Allergies  Past Medical History:   Diagnosis Date    Anemia     Bronchitis     Depression     GERD (gastroesophageal reflux disease)     Hypertension     Obesity, unspecified      Past Surgical History:   Procedure Laterality Date     SECTION      COLONOSCOPY      UPPER GASTROINTESTINAL ENDOSCOPY       History reviewed. No pertinent family history.  Social History     Tobacco Use    Smoking status: Never Smoker    Smokeless tobacco: Never Used   Substance Use Topics    Alcohol use: No    Drug use: No     Review of Systems   Constitutional: Negative for appetite change, diaphoresis, fatigue and fever.   Respiratory: Positive for shortness of breath. Negative for cough and wheezing.    Cardiovascular: Positive for chest pain. Negative for palpitations and leg swelling.   Gastrointestinal: Negative for abdominal pain, nausea and vomiting.   Musculoskeletal: Positive for arthralgias. Negative for back pain, gait problem, joint swelling and neck pain.   Skin: Positive for color change. Negative for wound.   Neurological: Negative for syncope.       Physical Exam     Initial Vitals [22]   BP Pulse Resp Temp SpO2   (!) 168/103 65 20 98 °F (36.7 °C) 97 %      MAP       --         Physical Exam    Nursing note and vitals reviewed.  Constitutional: She appears well-developed and well-nourished. She is not diaphoretic. No distress.   HENT:   Head: Normocephalic and atraumatic.   Neck: Neck supple.   Normal range of motion.  Cardiovascular: Intact distal pulses.   2+DP bilaterally. No obvious unilateral leg swelling or obvious calf ttp.    Pulmonary/Chest: Breath sounds normal. No respiratory distress.   TTP L inframammary chest wall, tenderness stops near midline axilla region of associated mid to lower ribs. No bony deformity. No overlying skin changes. Pain to this region with deep inspiration.    Abdominal: Abdomen is soft. Bowel sounds are normal. She exhibits no  distension. There is no abdominal tenderness.   No obvious flank or CVA tenderness.  No abdominal wall or flank ecchymosis.   Musculoskeletal:      Cervical back: Normal range of motion and neck supple.      Comments: Full, active ROM bilateral knees without discomfort or difficulty. L anterior knee, infrapatellar region or proximal tibia region with healing ecchymosis, no ttp. R anterior knee, infrapatellar or proximal tibia region with macular ecchymosis, ttp, no obvious bony deformity.      Neurological: She is alert and oriented to person, place, and time. GCS score is 15. GCS eye subscore is 4. GCS verbal subscore is 5. GCS motor subscore is 6.   Skin: Skin is warm. Capillary refill takes less than 2 seconds.   Psychiatric: She has a normal mood and affect. Thought content normal.         ED Course   Procedures  Labs Reviewed - No data to display  EKG Readings: (Independently Interpreted)   Normal sinus rhythm, ventricular rate 65 beats per minute.  Normal OH, normal QT.  no right axis deviation.  No ST elevation.  Nonspecific ST segment abnormality to lateral leads, lead 2. No gross change from previous dated 02/19/2021.     ECG Results          EKG 12-lead (Final result)  Result time 07/21/22 14:46:09    Final result by Interface, Lab In [facility] (07/21/22 14:46:09)                 Narrative:    Test Reason : R07.89,    Vent. Rate : 065 BPM     Atrial Rate : 065 BPM     P-R Int : 164 ms          QRS Dur : 094 ms      QT Int : 432 ms       P-R-T Axes : 034 -21 250 degrees     QTc Int : 449 ms    Normal sinus rhythm  Nonspecific T wave abnormality  Abnormal ECG  When compared with ECG of 19-FEB-2021 05:00,  No significant change was found  Confirmed by Jasen Pierre MD ([id number]) on 7/21/2022 2:46:00 PM    Referred By: [email or web address]   SELF           Confirmed By:Jasen Pierre MD                             EKG 12-LEAD (Final result)  Result time 07/21/22 15:02:40    Final result by Unknown User (07/21/22 15:02:40)                                 Imaging Results           CT Chest Without Contrast (Final result)  Result time 07/20/22 23:47:36    Final result by Derek Díaz MD (07/20/22 23:47:36)                 Impression:      No acute intrathoracic process.    7 mm pulmonary nodule in the right middle lobe.  For a solid nodule 6-8 mm, Fleischner Society 2017 guidelines recommend follow up with non-contrast chest CT at 6-12 months and 18-24 months after discovery.    This report was flagged in Epic as abnormal.      Electronically signed by: Derek Díaz MD  Date:    07/20/2022  Time:    23:47             Narrative:    EXAMINATION:  CT CHEST WITHOUT CONTRAST    CLINICAL HISTORY:  Chest trauma, blunt;L inframammary chest wall contusion;    TECHNIQUE:  Low dose axial images, sagittal and coronal reformations were obtained from the thoracic inlet to the lung bases. Contrast was not administered.    COMPARISON:  CT chest dated 07/07/2022.    FINDINGS:  The base of the neck is within normal limits.  The thyroid gland is unremarkable.  The supraclavicular regions are within normal limits.    The trachea is unremarkable.  The central airways are within normal limits.  There is no evidence of bronchiectasis.  No endobronchial lesion is identified.    The heart is unremarkable.  There are no pericardial effusions.  There are minimal coronary artery calcifications.  The thoracic aorta is normal in caliber.  There is no evidence of an intramural hematoma.  The mediastinal fat planes are preserved.    There is no evidence of lymphadenopathy in the chest.    There are no pleural effusions.  There is no evidence of a pneumothorax.  There is no evidence of pneumomediastinum.  There is a 7 mm pulmonary nodule in the right middle lobe.  There is subsegmental atelectasis in the left upper lobe and left lower lobe.    The esophagus is unremarkable.  The visualized upper abdominal structures are within normal limits.  There is no evidence of  free air in the upper abdomen.    There is unchanged increased vascularity in the anterior chest wall.  There are degenerative changes in the osseous structures.  There is no evidence of a fracture                                 Medications   HYDROcodone-acetaminophen 5-325 mg per tablet 1 tablet (1 tablet Oral Given 7/20/22 2302)     Medical Decision Making:   Differential Diagnosis:   Cardiac contusion, pulmonary contusion, aortic dissection, rib fracture  Clinical Tests:   Radiological Study: Ordered  Medical Tests: Ordered and Reviewed  ED Management:  Has had similar elevated BP in the past.  Did not take BP medication this morning.    No exogenous estrogen. No history of VTE. Low suspicion for PE as culprit of pleuritic pain. CT without acute fx or evidence of pulmonary contusion. I think unlikely cardiac contusion. Low suspicion for acute fx to lower extremities based on exam--no pain with weight-bearing, no obvious bony abnormality, tenderness likely 2/2 soft tissue contusions. Overall, suspect chest wall contusion, possibly occult fx. Given IS, pain control, return precautions discussed.  I feel she can be safely discharged follow up as an outpatient.  Patient is comfortable with this plan outpatient follow-up.                      Clinical Impression:   Final diagnoses:  [R07.89] Chest discomfort  [S20.212A] Contusion of left chest wall, initial encounter  [S80.10XA] Contusion of multiple sites of lower extremity, unspecified laterality, initial encounter  [W19.XXXA] Fall, initial encounter (Primary)          ED Disposition Condition    Discharge Stable        ED Prescriptions     Medication Sig Dispense Start Date End Date Auth. Provider    HYDROcodone-acetaminophen (NORCO) 5-325 mg per tablet Take 1 tablet by mouth every 6 (six) hours as needed (Severe pain). 10 tablet 7/21/2022  Byron Jaimes PA-C        Follow-up Information     Follow up With Specialties Details Why Contact Info    Danika PARK  MD [facility] Schedule an appointment as soon as possible for a visit  For reevaluation, If symptoms persist [address] [facility]  [address]  [facility]  [facility] [address]  [phone]             Byron Jaimes PA-C  07/21/22 3844

## 2022-07-21 NOTE — ED TRIAGE NOTES
Per pt she had a fall on Friday and states she is having L sided rib pain since the fall. Pt reports pain with any and all movements. Pt observed guarding L side upon ambulating

## 2022-07-21 NOTE — DISCHARGE INSTRUCTIONS
Continue with Norco as needed for severe pain. Be aware, this medication is sedating.  Do not mix with alcohol or any other sedating medications.  Do not drive or operate machinery when taking this medication.  Use incentive spirometer frequently throughout the day to help prevent a pneumonia.    Contact your primary care provider for follow-up and further recommendation should you experience persistent pain.    Return to this ED if worsening pain, if you begin with fever, worsening shortness of breath, if you feel lightheaded or feel as if you are going to pass out, if any other problems occur.

## 2022-11-22 ENCOUNTER — HOSPITAL ENCOUNTER (EMERGENCY)
Facility: HOSPITAL | Age: 52
Discharge: HOME | End: 2022-11-22
Attending: EMERGENCY MEDICINE
Payer: COMMERCIAL

## 2022-11-22 VITALS
HEART RATE: 64 BPM | SYSTOLIC BLOOD PRESSURE: 115 MMHG | HEIGHT: 64 IN | DIASTOLIC BLOOD PRESSURE: 57 MMHG | TEMPERATURE: 98 F | RESPIRATION RATE: 18 BRPM | WEIGHT: 293 LBS | OXYGEN SATURATION: 100 % | BODY MASS INDEX: 50.02 KG/M2

## 2022-11-22 DIAGNOSIS — D58.2 ELEVATED HEMOGLOBIN: ICD-10-CM

## 2022-11-22 DIAGNOSIS — L03.90 CELLULITIS, UNSPECIFIED CELLULITIS SITE: Primary | ICD-10-CM

## 2022-11-22 DIAGNOSIS — M79.669 CALF PAIN: ICD-10-CM

## 2022-11-22 LAB
ALBUMIN SERPL BCP-MCNC: 4 G/DL (ref 3.5–5.2)
ALP SERPL-CCNC: 68 U/L (ref 55–135)
ALT SERPL W/O P-5'-P-CCNC: 18 U/L (ref 10–44)
ANION GAP SERPL CALC-SCNC: 10 MMOL/L (ref 8–16)
AST SERPL-CCNC: 24 U/L (ref 10–40)
BASOPHILS # BLD AUTO: 0.05 K/UL (ref 0–0.2)
BASOPHILS NFR BLD: 0.5 % (ref 0–1.9)
BILIRUB SERPL-MCNC: 0.6 MG/DL (ref 0.1–1)
BUN SERPL-MCNC: 21 MG/DL (ref 6–20)
CALCIUM SERPL-MCNC: 9.7 MG/DL (ref 8.7–10.5)
CHLORIDE SERPL-SCNC: 100 MMOL/L (ref 95–110)
CO2 SERPL-SCNC: 24 MMOL/L (ref 23–29)
CREAT SERPL-MCNC: 1.1 MG/DL (ref 0.5–1.4)
CRP SERPL-MCNC: 196 MG/L (ref 0–8.2)
D DIMER PPP IA.FEU-MCNC: 1.18 MG/L FEU
DIFFERENTIAL METHOD: ABNORMAL
EOSINOPHIL # BLD AUTO: 0 K/UL (ref 0–0.5)
EOSINOPHIL NFR BLD: 0.2 % (ref 0–8)
ERYTHROCYTE [DISTWIDTH] IN BLOOD BY AUTOMATED COUNT: 14.9 % (ref 11.5–14.5)
ERYTHROCYTE [SEDIMENTATION RATE] IN BLOOD BY WESTERGREN METHOD: 15 MM/HR (ref 0–20)
EST. GFR  (NO RACE VARIABLE): >60 ML/MIN/1.73 M^2
GLUCOSE SERPL-MCNC: 99 MG/DL (ref 70–110)
HCT VFR BLD AUTO: 50.2 % (ref 37–48.5)
HGB BLD-MCNC: 16.3 G/DL (ref 12–16)
IMM GRANULOCYTES # BLD AUTO: 0.04 K/UL (ref 0–0.04)
IMM GRANULOCYTES NFR BLD AUTO: 0.4 % (ref 0–0.5)
LYMPHOCYTES # BLD AUTO: 1 K/UL (ref 1–4.8)
LYMPHOCYTES NFR BLD: 9 % (ref 18–48)
MCH RBC QN AUTO: 27.2 PG (ref 27–31)
MCHC RBC AUTO-ENTMCNC: 32.5 G/DL (ref 32–36)
MCV RBC AUTO: 84 FL (ref 82–98)
MONOCYTES # BLD AUTO: 0.5 K/UL (ref 0.3–1)
MONOCYTES NFR BLD: 4.7 % (ref 4–15)
NEUTROPHILS # BLD AUTO: 9.1 K/UL (ref 1.8–7.7)
NEUTROPHILS NFR BLD: 85.2 % (ref 38–73)
NRBC BLD-RTO: 0 /100 WBC
PLATELET # BLD AUTO: 179 K/UL (ref 150–450)
PMV BLD AUTO: 11.9 FL (ref 9.2–12.9)
POTASSIUM SERPL-SCNC: 4.2 MMOL/L (ref 3.5–5.1)
PROT SERPL-MCNC: 9.2 G/DL (ref 6–8.4)
RBC # BLD AUTO: 6 M/UL (ref 4–5.4)
SODIUM SERPL-SCNC: 134 MMOL/L (ref 136–145)
WBC # BLD AUTO: 10.63 K/UL (ref 3.9–12.7)

## 2022-11-22 PROCEDURE — 96374 THER/PROPH/DIAG INJ IV PUSH: CPT

## 2022-11-22 PROCEDURE — 86140 C-REACTIVE PROTEIN: CPT | Performed by: PHYSICIAN ASSISTANT

## 2022-11-22 PROCEDURE — 96376 TX/PRO/DX INJ SAME DRUG ADON: CPT

## 2022-11-22 PROCEDURE — 63600175 PHARM REV CODE 636 W HCPCS: Performed by: PHYSICIAN ASSISTANT

## 2022-11-22 PROCEDURE — 85379 FIBRIN DEGRADATION QUANT: CPT | Performed by: PHYSICIAN ASSISTANT

## 2022-11-22 PROCEDURE — 80053 COMPREHEN METABOLIC PANEL: CPT | Performed by: PHYSICIAN ASSISTANT

## 2022-11-22 PROCEDURE — 85652 RBC SED RATE AUTOMATED: CPT | Performed by: PHYSICIAN ASSISTANT

## 2022-11-22 PROCEDURE — 99285 EMERGENCY DEPT VISIT HI MDM: CPT | Mod: 25

## 2022-11-22 PROCEDURE — 85025 COMPLETE CBC W/AUTO DIFF WBC: CPT | Performed by: PHYSICIAN ASSISTANT

## 2022-11-22 RX ORDER — CEPHALEXIN 500 MG/1
500 CAPSULE ORAL 4 TIMES DAILY
Qty: 40 CAPSULE | Refills: 0 | Status: SHIPPED | OUTPATIENT
Start: 2022-11-22 | End: 2022-12-02

## 2022-11-22 RX ORDER — IBUPROFEN 600 MG/1
600 TABLET ORAL EVERY 6 HOURS PRN
Qty: 20 TABLET | Refills: 0 | Status: SHIPPED | OUTPATIENT
Start: 2022-11-22 | End: 2022-11-27

## 2022-11-22 RX ORDER — KETOROLAC TROMETHAMINE 30 MG/ML
15 INJECTION, SOLUTION INTRAMUSCULAR; INTRAVENOUS
Status: COMPLETED | OUTPATIENT
Start: 2022-11-22 | End: 2022-11-22

## 2022-11-22 RX ORDER — ACETAMINOPHEN 500 MG
500 TABLET ORAL EVERY 4 HOURS PRN
Qty: 20 TABLET | Refills: 0 | Status: SHIPPED | OUTPATIENT
Start: 2022-11-22 | End: 2022-11-27

## 2022-11-22 RX ADMIN — CEFTRIAXONE 1 G: 1 INJECTION, SOLUTION INTRAVENOUS at 05:11

## 2022-11-22 RX ADMIN — KETOROLAC TROMETHAMINE 15 MG: 30 INJECTION, SOLUTION INTRAMUSCULAR at 06:11

## 2022-11-22 NOTE — ED PROVIDER NOTES
Encounter Date: 2022       History     Chief Complaint   Patient presents with    Leg Pain     Pt states that she started to have R leg pain, redness and swelling noted, pt has hx of cellulitis.       Inga Woods is a 52 year old female with history significant for HTN and cellulitis who presents to the ED for posterior right lower leg pain, redness, and warmth for 1 day.  Patient states she was driving to [place] 1 week ago and returned 3 days ago- 10 hours to and from [facility]  into 4 hour drive and 6 hour drive- when she began experiencing right lower leg pain, but became concern for the erythema and warmth yesterday..  She complains of chills, headache, diarrhea, nausea, and 2 episodes of emesis last night.  She denies fever, numbness, tingling, shortness of breath, chest pain, cough, palpitations, and dysuria.     Denies history of DVT or PE, recent trauma surgery, history of cancer, hormone therapy, hemoptysis      Review of patient's allergies indicates:  No Known Allergies  Past Medical History:   Diagnosis Date    Anemia     Bronchitis     Depression     GERD (gastroesophageal reflux disease)     Hypertension     Obesity, unspecified      Past Surgical History:   Procedure Laterality Date     SECTION      COLONOSCOPY      UPPER GASTROINTESTINAL ENDOSCOPY       History reviewed. No pertinent family history.  Social History     Tobacco Use    Smoking status: Never    Smokeless tobacco: Never   Substance Use Topics    Alcohol use: No    Drug use: No     Review of Systems   Constitutional:  Positive for chills. Negative for activity change, appetite change, fatigue and fever.   HENT:  Negative for congestion, rhinorrhea and sore throat.    Respiratory:  Negative for cough, shortness of breath and wheezing.    Cardiovascular:  Negative for chest pain and palpitations.   Gastrointestinal:  Positive for diarrhea, nausea and vomiting. Negative for abdominal pain and constipation.    Genitourinary:  Negative for dysuria, frequency and hematuria.   Musculoskeletal:  Negative for arthralgias and myalgias.   Skin:         Right lower leg pain, warmth, and erythema   Neurological:  Positive for light-headedness and headaches. Negative for dizziness, weakness and numbness.     Physical Exam     Initial Vitals [11/22/22 1530]   BP Pulse Resp Temp SpO2   127/75 77 20 98.5 °F (36.9 °C) 97 %      MAP       --         Physical Exam    Nursing note and vitals reviewed.  Constitutional: She appears well-developed and well-nourished. She is not diaphoretic. She is Obese . She does not appear ill. No distress.   HENT:   Head: Normocephalic and atraumatic.   Right Ear: External ear normal.   Left Ear: External ear normal.   Nose: Nose normal.   Mouth/Throat: Oropharynx is clear and moist. No oropharyngeal exudate.   Eyes: Conjunctivae and EOM are normal. Pupils are equal, round, and reactive to light.   Neck: Neck supple. No JVD present.   Normal range of motion.  Cardiovascular:  Normal rate, regular rhythm, normal heart sounds and intact distal pulses.     Exam reveals no gallop and no friction rub.       No murmur heard.  Pulses:       Dorsalis pedis pulses are 2+ on the right side and 2+ on the left side.   Pulmonary/Chest: Breath sounds normal. No respiratory distress. She has no wheezes. She has no rhonchi. She has no rales.   Abdominal: Abdomen is soft. Bowel sounds are normal. She exhibits no distension. There is no abdominal tenderness.   Musculoskeletal:         General: Normal range of motion.      Cervical back: Normal range of motion and neck supple.      Comments: Mild ttp R calf     Neurological: She is alert and oriented to person, place, and time. No sensory deficit.   Skin: Skin is warm and dry. Capillary refill takes 2 to 3 seconds.        Nontender circumscribed erythema.  Cool and dry.  Normal knee and leg ROM.  Varicosities bilaterally.     Psychiatric: She has a normal mood and affect.  Her behavior is normal. Judgment and thought content normal.       ED Course   Procedures  Labs Reviewed - No data to display       Imaging Results    None          Medications - No data to display  Medical Decision Making:   Clinical Tests:   Lab Tests: Reviewed and Ordered  Radiological Study: Ordered and Reviewed  ED Management:  52-year-old female presenting for evaluation of right calf redness and pain.  Does have history of cellulitis requiring admission to the hospital several years ago.  She denies any fever, chills, nausea vomiting any falls or trauma.  Does endorse recent travel.  Exam above.  Labs unremarkable.  CBC with elevated RBC and H&H.  Only mildly elevated.  Will refer to Hematology-Oncology.  No leukocytosis.  CMP without significant electrolyte abnormality renal insufficiency.  Ultrasound of the right lower extremity is negative for DVT.  Will refer patient to vascular as this may be venous stasis, vascular insufficiency among others.  She denies any chest pain or shortness breath.  Will have patient return to the emergency department worsening symptoms or as needed.  Will cover the patient with antibiotics for possible cellulitis causing her symptoms.                        Clinical Impression:   Final diagnoses:  [M79.669] Calf pain               Emily Heaton PA-C  11/23/22 0958

## 2022-11-23 ENCOUNTER — TELEPHONE (OUTPATIENT)
Dept: HEMATOLOGY/ONCOLOGY | Facility: CLINIC | Age: 52
End: 2022-11-23
Payer: MEDICAID

## 2022-11-23 NOTE — TELEPHONE ENCOUNTER
Message marked urgent and sent to Emily MATUTE & Alaina Beckham  in ER   This patient was referred to us as an URGENT from the ER for an elevated Hbg  Unfortunately, our office is not contracted with her insurance.  Please refer to main [place] or another facility of your choice. Thank you Irina Beckham, MSW  Irina Guzman RN  ok.  It show [employer] but I will tell her [place] [facility] [place]

## 2022-11-23 NOTE — DISCHARGE INSTRUCTIONS

## 2022-11-23 NOTE — PROGRESS NOTES
Post discharge f/u:  Ambulatory referral sent to Oncology/Hematology.  Message received from SCAR Arevalo, advising that patient's insurance is not accepted in Dr. Terrell's office.  SW attempted f/u with patient to advise her to f/u with her PCP for a referral to an Oncologist.  Voice message left at [phone] requesting return call to [facility] at [phone].

## 2022-11-29 ENCOUNTER — TELEPHONE (OUTPATIENT)
Dept: HEMATOLOGY/ONCOLOGY | Facility: CLINIC | Age: 52
End: 2022-11-29
Payer: MEDICAID

## 2022-11-29 NOTE — TELEPHONE ENCOUNTER
This pt is listed in our system as medicaid  If she has [employer] we take it but they need to enter correct insurance in the chart for us to verify it  Andi odonnell

## 2024-04-11 NOTE — ASSESSMENT & PLAN NOTE
Met criteria for sepsis with tachycardia, leukocytosis and source for infection  With evidence of cellulitis of right thigh  No fluctuation to suspect abscess. Does not appear to involve joint or groin area  Area marked in the ED. Already improving  US to r/o DVT  On vanc and zosyn. BCx pending         Detail Level: Zone